# Patient Record
Sex: FEMALE | Race: WHITE | Employment: UNEMPLOYED | URBAN - METROPOLITAN AREA
[De-identification: names, ages, dates, MRNs, and addresses within clinical notes are randomized per-mention and may not be internally consistent; named-entity substitution may affect disease eponyms.]

---

## 2017-01-18 ENCOUNTER — OFFICE VISIT (OUTPATIENT)
Dept: INTERNAL MEDICINE CLINIC | Age: 72
End: 2017-01-18

## 2017-01-18 VITALS
SYSTOLIC BLOOD PRESSURE: 152 MMHG | WEIGHT: 187.8 LBS | RESPIRATION RATE: 16 BRPM | BODY MASS INDEX: 32.06 KG/M2 | HEART RATE: 82 BPM | TEMPERATURE: 98.5 F | HEIGHT: 64 IN | DIASTOLIC BLOOD PRESSURE: 78 MMHG | OXYGEN SATURATION: 98 %

## 2017-01-18 DIAGNOSIS — I10 ESSENTIAL HYPERTENSION: ICD-10-CM

## 2017-01-18 DIAGNOSIS — R51.9 ACUTE INTRACTABLE HEADACHE, UNSPECIFIED HEADACHE TYPE: Primary | ICD-10-CM

## 2017-01-18 RX ORDER — METHYLPREDNISOLONE 4 MG/1
TABLET ORAL
Qty: 1 DOSE PACK | Refills: 0 | Status: SHIPPED | OUTPATIENT
Start: 2017-01-18 | End: 2017-01-24

## 2017-01-18 NOTE — PATIENT INSTRUCTIONS

## 2017-01-18 NOTE — PROGRESS NOTES
Rashmi Parks is a 67 y.o. female who was seen in clinic today (1/18/2017). Assessment & Plan:  Dmitry Licona was seen today for headache, sinus pain and neck pain. Diagnoses and all orders for this visit:    Acute intractable headache, unspecified headache type- this is a new problem, differential dx reviewed with the patient, unclear. Favor cervical or related to sinuses. Does not sound fibromyalgia related. Will treat with heat to the back of the neck, meds below. Red flags were reviewed with the patient to RTC or notify me, expected time course for resolution reviewed. -     methylPREDNISolone (MEDROL DOSEPACK) 4 mg tablet; use as directed    Essential hypertension- uncontrolled, not taking BP meds, and does not want to, recommended to f/u with PCP. Follow-up Disposition:  Return if symptoms worsen or fail to improve.       ----------------------------------------------------------------------    Subjective:  Neurological Review  She is here today to talk about headaches. This is a new problem. Episodes are occuring continuously for 2 days ago, but has been intermittent for the last 7 days  The pain is described as dull ahcing pain and it is located from the back of the head up to the front. Associated symptoms include: congestion. Precipitating factors are: patient is aware of none. She was seen back in Dec for sinusitis, this improved but never resolved completely. She denies a history of recent head injury. Treatments for headaches currently include: unable to obtain relief with OTC meds (has tried heat/ice & ibuprofen & flonase). Prior to Admission medications    Medication Sig Start Date End Date Taking? Authorizing Provider   esomeprazole (NEXIUM) 40 mg capsule Take 1 Cap by mouth daily. 11/9/16  Yes Sobia Salas MD   amphetamine-dextroamphetamine XR (ADDERALL XR) 20 mg XR capsule Take 1 Cap by mouth every morning for 30 days. Max Daily Amount: 20 mg.  As needed 1/4/17 2/3/17 Yes Morena Solis MD   DULoxetine (CYMBALTA) 60 mg capsule Take 1 Cap by mouth daily. 10/25/16  Yes Morena Solis MD   estradiol (ESTRACE) 0.5 mg tablet TAKE ONE TABLET BY MOUTH ONCE DAILY 10/10/16  Yes Morena Solis MD   levothyroxine (SYNTHROID) 100 mcg tablet TAKE ONE TABLET BY MOUTH ONCE DAILY BEFORE  BREAKFAST FOR HYPOTHYROIDISM 10/10/16  Yes Morena Solis MD   ibuprofen (MOTRIN) 800 mg tablet Take 800 mg by mouth every eight (8) hours as needed for Pain. Yes Historical Provider   fluticasone (FLONASE) 50 mcg/actuation nasal spray 2 Sprays by Both Nostrils route daily. Patient taking differently: 2 Sprays by Both Nostrils route daily as needed. 1/14/16  Yes Morena Solis MD   amLODIPine (NORVASC) 5 mg tablet Take 1 Tab by mouth daily. 12/2/16   Morena Solis MD          No Known Allergies        Review of Systems   Constitutional: Negative for chills and fever. HENT: Positive for congestion. Negative for ear pain. Eyes: Negative for blurred vision and pain. Musculoskeletal: Positive for myalgias (typical fibromyalgia pain) and neck pain (typical fibromyalgia pain). Neurological: Positive for headaches. Negative for dizziness and tingling. Objective:   Physical Exam   Constitutional: No distress. HENT:   Right Ear: Tympanic membrane is scarred. Tympanic membrane is not erythematous and not bulging. No middle ear effusion. Left Ear: Tympanic membrane is scarred. Tympanic membrane is not erythematous and not bulging. No middle ear effusion. Nose: No mucosal edema or rhinorrhea. Right sinus exhibits no maxillary sinus tenderness and no frontal sinus tenderness. Left sinus exhibits no maxillary sinus tenderness and no frontal sinus tenderness. Eyes: Conjunctivae are normal. No scleral icterus. Neck: Neck supple. Cardiovascular: Regular rhythm and normal heart sounds. No murmur heard. Pulmonary/Chest: Effort normal and breath sounds normal. She has no wheezes. She has no rales. Musculoskeletal:        Cervical back: She exhibits decreased range of motion (painful w/ full extension) and tenderness. She exhibits no bony tenderness, no deformity, no pain and no spasm. Lymphadenopathy:     She has no cervical adenopathy. Neurological: She has normal strength. A sensory deficit is present. No cranial nerve deficit. Visit Vitals    /78    Pulse 82    Temp 98.5 °F (36.9 °C) (Oral)    Resp 16    Ht 5' 4.25\" (1.632 m)    Wt 187 lb 12.8 oz (85.2 kg)    SpO2 98%    BMI 31.99 kg/m2         Disclaimer:  Advised her to call back or return to office if symptoms worsen/change/persist.  Discussed expected course/resolution/complications of diagnosis in detail with patient. Medication risks/benefits/costs/interactions/alternatives discussed with patient. She was given an after visit summary which includes diagnoses, current medications, & vitals. She expressed understanding with the diagnosis and plan.         Charly Kilpatrick MD

## 2017-01-18 NOTE — PROGRESS NOTES
Treated for sinus infection in November. Symptoms never resolved and now has continuous headache and neck pain into the top of her head.

## 2017-01-26 RX ORDER — DEXTROAMPHETAMINE SACCHARATE, AMPHETAMINE ASPARTATE MONOHYDRATE, DEXTROAMPHETAMINE SULFATE AND AMPHETAMINE SULFATE 5; 5; 5; 5 MG/1; MG/1; MG/1; MG/1
20 CAPSULE, EXTENDED RELEASE ORAL
Qty: 30 CAP | Refills: 0 | Status: SHIPPED | OUTPATIENT
Start: 2017-01-26 | End: 2017-02-25

## 2017-01-27 ENCOUNTER — TELEPHONE (OUTPATIENT)
Dept: INTERNAL MEDICINE CLINIC | Age: 72
End: 2017-01-27

## 2017-01-27 DIAGNOSIS — M79.7 FIBROMYALGIA: Primary | ICD-10-CM

## 2017-01-30 ENCOUNTER — TELEPHONE (OUTPATIENT)
Dept: INTERNAL MEDICINE CLINIC | Age: 72
End: 2017-01-30

## 2017-01-31 NOTE — TELEPHONE ENCOUNTER
Pt states she is having sinus headaches again. She states she finished her abx and steroids and it first went away but now has come back again.

## 2017-02-01 ENCOUNTER — TELEPHONE (OUTPATIENT)
Dept: INTERNAL MEDICINE CLINIC | Age: 72
End: 2017-02-01

## 2017-02-01 NOTE — TELEPHONE ENCOUNTER
She is due for her routine follow up on her medications in 3/17. Can refill for longer amount at that time. Advise to make her appt as not currently scheduled.

## 2017-02-01 NOTE — TELEPHONE ENCOUNTER
Justice Ward (Self) [phone]     Patient is returning KJ's call. I read her the note that Dr. Evelio Parks says she needs an appt, but she wants to talk to the nurse. Will only be at number above until 1pm today.

## 2017-02-01 NOTE — TELEPHONE ENCOUNTER
Called and spoke with pt and asked pt how she is feeling. Pt stated she is feeling better today. Pt stated she would like to wait until Monday to see how she feels before making an appt. Also notified pt her script for Adderall  is ready for . Pt stated Dr Nithin Del Cid usually gives her 3 scripts at a time.

## 2017-02-28 ENCOUNTER — OFFICE VISIT (OUTPATIENT)
Dept: INTERNAL MEDICINE CLINIC | Age: 72
End: 2017-02-28

## 2017-02-28 VITALS
HEIGHT: 64 IN | SYSTOLIC BLOOD PRESSURE: 122 MMHG | DIASTOLIC BLOOD PRESSURE: 80 MMHG | TEMPERATURE: 98.7 F | RESPIRATION RATE: 16 BRPM | BODY MASS INDEX: 32.06 KG/M2 | HEART RATE: 83 BPM | WEIGHT: 187.8 LBS | OXYGEN SATURATION: 98 %

## 2017-02-28 DIAGNOSIS — K21.9 GASTROESOPHAGEAL REFLUX DISEASE WITHOUT ESOPHAGITIS: ICD-10-CM

## 2017-02-28 DIAGNOSIS — Z13.39 SCREENING FOR ALCOHOLISM: ICD-10-CM

## 2017-02-28 DIAGNOSIS — J30.89 NON-SEASONAL ALLERGIC RHINITIS DUE TO OTHER ALLERGIC TRIGGER: ICD-10-CM

## 2017-02-28 DIAGNOSIS — Z79.890 POSTMENOPAUSAL HRT (HORMONE REPLACEMENT THERAPY): ICD-10-CM

## 2017-02-28 DIAGNOSIS — E78.00 PURE HYPERCHOLESTEROLEMIA: ICD-10-CM

## 2017-02-28 DIAGNOSIS — F98.8 ADD (ATTENTION DEFICIT DISORDER): ICD-10-CM

## 2017-02-28 DIAGNOSIS — E55.9 VITAMIN D DEFICIENCY: ICD-10-CM

## 2017-02-28 DIAGNOSIS — Z00.00 MEDICARE ANNUAL WELLNESS VISIT, SUBSEQUENT: ICD-10-CM

## 2017-02-28 DIAGNOSIS — I10 ESSENTIAL HYPERTENSION: Primary | ICD-10-CM

## 2017-02-28 DIAGNOSIS — E03.9 ACQUIRED HYPOTHYROIDISM: ICD-10-CM

## 2017-02-28 DIAGNOSIS — M79.7 FIBROMYALGIA: ICD-10-CM

## 2017-02-28 RX ORDER — DEXTROAMPHETAMINE SACCHARATE, AMPHETAMINE ASPARTATE MONOHYDRATE, DEXTROAMPHETAMINE SULFATE AND AMPHETAMINE SULFATE 5; 5; 5; 5 MG/1; MG/1; MG/1; MG/1
20 CAPSULE, EXTENDED RELEASE ORAL
Qty: 30 CAP | Refills: 0 | Status: SHIPPED | OUTPATIENT
Start: 2017-04-28 | End: 2017-05-31 | Stop reason: SDUPTHER

## 2017-02-28 RX ORDER — MINERAL OIL
180 ENEMA (ML) RECTAL
COMMUNITY
Start: 2017-02-28 | End: 2018-02-27

## 2017-02-28 RX ORDER — DEXTROAMPHETAMINE SACCHARATE, AMPHETAMINE ASPARTATE MONOHYDRATE, DEXTROAMPHETAMINE SULFATE AND AMPHETAMINE SULFATE 5; 5; 5; 5 MG/1; MG/1; MG/1; MG/1
20 CAPSULE, EXTENDED RELEASE ORAL
Qty: 30 CAP | Refills: 0 | Status: SHIPPED | OUTPATIENT
Start: 2017-03-28 | End: 2017-04-27

## 2017-02-28 RX ORDER — DEXTROAMPHETAMINE SACCHARATE, AMPHETAMINE ASPARTATE, DEXTROAMPHETAMINE SULFATE AND AMPHETAMINE SULFATE 5; 5; 5; 5 MG/1; MG/1; MG/1; MG/1
20 TABLET ORAL
COMMUNITY
End: 2017-02-28 | Stop reason: SDUPTHER

## 2017-02-28 RX ORDER — DEXTROAMPHETAMINE SACCHARATE, AMPHETAMINE ASPARTATE, DEXTROAMPHETAMINE SULFATE AND AMPHETAMINE SULFATE 5; 5; 5; 5 MG/1; MG/1; MG/1; MG/1
20 TABLET ORAL DAILY
Qty: 30 TAB | Refills: 0 | Status: SHIPPED | OUTPATIENT
Start: 2017-02-28 | End: 2017-05-31 | Stop reason: SDUPTHER

## 2017-02-28 NOTE — PROGRESS NOTES
HPI:  Santos Davies is a 67y.o. year old female who returns to clinic today for follow up: hypothyroid, ADD, GERD, elevated BP, fibromyalgia, and depression.       1. Fibromyalgia: symptoms are stable with cymbalta. She continues to have some daily pain. 2. Hypothyroid: Thyroid ROS:  denies weight changes, heat/cold intolerance, bowel/skin changes or CVS symptoms. 3. ADD: taking adderall xr daily and doing well with her focus. No.side effects   4. Cardiovascular: The home BP readings outside of office not checking . Diet and Lifestyle: generally follows a low fat low cholesterol diet, generally follows a low sodium diet. No CP or SOB with exertion. 5. Depression:  Treatment includes cymbalta and no other therapies. Ongoing symptoms include none. She denies depressed mood, anhedonia and No suicidal ideation. .   She experiences the following side effects from the treatment: none. 6. GERD: Taking medication daily and if misses dose has breakthrough gerd symptoms. Following gerd precautions. Current Outpatient Prescriptions   Medication Sig Dispense Refill    dextroamphetamine-amphetamine (ADDERALL) 20 mg tablet Take 20 mg by mouth.  esomeprazole (NEXIUM) 40 mg capsule Take 1 Cap by mouth daily. 90 Cap 1    DULoxetine (CYMBALTA) 60 mg capsule Take 1 Cap by mouth daily. 90 Cap 1    estradiol (ESTRACE) 0.5 mg tablet TAKE ONE TABLET BY MOUTH ONCE DAILY 90 Tab 1    levothyroxine (SYNTHROID) 100 mcg tablet TAKE ONE TABLET BY MOUTH ONCE DAILY BEFORE  BREAKFAST FOR HYPOTHYROIDISM 90 Tab 1    ibuprofen (MOTRIN) 800 mg tablet Take 800 mg by mouth every eight (8) hours as needed for Pain.  fluticasone (FLONASE) 50 mcg/actuation nasal spray 2 Sprays by Both Nostrils route daily. (Patient taking differently: 2 Sprays by Both Nostrils route daily as needed.) 1 Bottle 5    amLODIPine (NORVASC) 5 mg tablet Take 1 Tab by mouth daily.  90 Tab 1     Not on File  Social History   Substance Use Topics    Smoking status: Former Smoker     Quit date: 1/1/1975    Smokeless tobacco: Never Used    Alcohol use No         Review of Systems   Constitutional: Negative for malaise/fatigue. HENT:        Notes has daily sinus pressure and nasal congestion with post nasal drainage for months. Clear drainage. Respiratory: Negative for shortness of breath. Cardiovascular: Negative for chest pain and leg swelling. Gastrointestinal: Negative for abdominal pain and heartburn. Neurological: Negative for dizziness and headaches. Physical Exam   Constitutional: She appears well-developed. No distress. /80 (BP 1 Location: Right arm, BP Patient Position: Sitting)  Pulse 83  Temp 98.7 °F (37.1 °C) (Oral)   Resp 16  Ht 5' 4.25\" (1.632 m)  Wt 187 lb 12.8 oz (85.2 kg)  SpO2 98%  BMI 31.99 kg/m2   Neck: Carotid bruit is not present. No thyromegaly present. Cardiovascular: Normal rate, regular rhythm, normal heart sounds and intact distal pulses. No murmur heard. Pulmonary/Chest: Effort normal and breath sounds normal. She has no wheezes. Abdominal: Soft. Bowel sounds are normal. She exhibits no distension and no mass. There is no tenderness. Musculoskeletal: She exhibits no edema. Psychiatric: She has a normal mood and affect. Vitals reviewed. Assessment & Plan:    ICD-10-CM ICD-9-CM    1. Essential hypertension  Well controlled, continue current medication. M15 845.4 METABOLIC PANEL, COMPREHENSIVE   2. Fibromyalgia  Stable. Continue current medication. M79.7 729.1    3. ADD (attention deficit disorder)  Well controlled, continue current medication. F98.8 314.00    4. Acquired hypothyroidism   Continue current plan and check lab work. E03.9 244.9 TSH 3RD GENERATION      T4, FREE   5. Vitamin D deficiency  Check level E55.9 268.9 VITAMIN D, 25 HYDROXY   6. Gastroesophageal reflux disease without esophagitis  Well controlled, continue current medication. K21.9 530.81    7.  Postmenopausal HRT (hormone replacement therapy)  Long discussion with patient regarding use of hormone replacement therapy. She is counseled that there is a small increase risk of stroke, heart disease, and breast cancer with chronic use of low dose estrogen. She is adamant that she needs to remain on this medication due to significant hot flashes and is willing to accept the risks of remaining on long term hormone replacement  therapy. Z79.890 V07.4    8. Screening for alcoholism Z13.89 V79.1    9. Non-seasonal allergic rhinitis due to other allergic trigger  Trial of allegra and flonase daily. J30.89     10. Medicare annual wellness visit, subsequent  Health maintenance reviewed and updated with patient today at visit. Z00.00 V70.0    11. Pure hypercholesterolemia  Please continue to work on low fat, low cholesterol diet and exercise. E78.00 272.0 LIPID PANEL      CBC WITH AUTOMATED DIFF     Orders Placed This Encounter    LIPID PANEL    METABOLIC PANEL, COMPREHENSIVE    CBC WITH AUTOMATED DIFF    VITAMIN D, 25 HYDROXY    TSH 3RD GENERATION    T4, FREE    DISCONTD: dextroamphetamine-amphetamine (ADDERALL) 20 mg tablet    fexofenadine (ALLEGRA) 180 mg tablet    dextroamphetamine-amphetamine (ADDERALL) 20 mg tablet    amphetamine-dextroamphetamine XR (ADDERALL XR) 20 mg XR capsule    amphetamine-dextroamphetamine XR (ADDERALL XR) 20 mg XR capsule         Follow-up Disposition:  Return for follow up. Advised her to call back or return to office if symptoms worsen/change/persist.  Discussed expected course/resolution/complications of diagnosis in detail with patient. Medication risks/benefits/costs/interactions/alternatives discussed with patient. She was given an after visit summary which includes diagnoses, current medications, & vitals. She expressed understanding with the diagnosis and plan.       This is also a Subsequent Medicare Annual Wellness Visit providing Personalized Prevention Plan Services (PPPS) (Performed 12 months after initial AWV and PPPS )    I have reviewed the patient's medical history in detail and updated the computerized patient record. History     Past Medical History:   Diagnosis Date    ADD (attention deficit disorder) 9/15/2015    Arrhythmia     Depression 1/14/2016    Fibromyalgia 9/15/2015    Gastroesophageal reflux disease without esophagitis 9/15/2015    History of DVT (deep vein thrombosis) 9/15/2015    HTN (hypertension) 2/18/2016    Menopausal syndrome (hot flashes) 9/15/2015    Unspecified hypothyroidism 9/15/2015      Past Surgical History:   Procedure Laterality Date    ABLATION OF SVT  2010    CARDIAC SURG PROCEDURE UNLIST  2010    ablation    HX GI  2010    benign stomach tumor    HX GYN      ovarian cyst removal    HX GYN  1985    hysterectomy     Current Outpatient Prescriptions   Medication Sig Dispense Refill    dextroamphetamine-amphetamine (ADDERALL) 20 mg tablet Take 20 mg by mouth.  esomeprazole (NEXIUM) 40 mg capsule Take 1 Cap by mouth daily. 90 Cap 1    DULoxetine (CYMBALTA) 60 mg capsule Take 1 Cap by mouth daily. 90 Cap 1    estradiol (ESTRACE) 0.5 mg tablet TAKE ONE TABLET BY MOUTH ONCE DAILY 90 Tab 1    levothyroxine (SYNTHROID) 100 mcg tablet TAKE ONE TABLET BY MOUTH ONCE DAILY BEFORE  BREAKFAST FOR HYPOTHYROIDISM 90 Tab 1    ibuprofen (MOTRIN) 800 mg tablet Take 800 mg by mouth every eight (8) hours as needed for Pain.  fluticasone (FLONASE) 50 mcg/actuation nasal spray 2 Sprays by Both Nostrils route daily. (Patient taking differently: 2 Sprays by Both Nostrils route daily as needed.) 1 Bottle 5    amLODIPine (NORVASC) 5 mg tablet Take 1 Tab by mouth daily.  80 Tab 1     Not on File  Family History   Problem Relation Age of Onset    Diabetes Mother     Heart Disease Mother     Hypertension Mother     Lung Disease Father      COPD    Cancer Brother      bladder    Heart Disease Brother     Cancer Maternal Aunt      breast    Diabetes Paternal Uncle     Hypertension Sister      Social History   Substance Use Topics    Smoking status: Former Smoker     Quit date: 1/1/1975    Smokeless tobacco: Never Used    Alcohol use No     Patient Active Problem List   Diagnosis Code    Fibromyalgia M79.7    Hypothyroidism E03.9    Gastroesophageal reflux disease without esophagitis K21.9    Menopausal syndrome (hot flashes) N95.1    ADD (attention deficit disorder) F98.8    History of DVT (deep vein thrombosis) Z86.718    Need for prophylactic vaccination against Streptococcus pneumoniae (pneumococcus) Z23    Advanced care planning/counseling discussion Z71.89    Depression F32.9    HTN (hypertension) I10       Depression Risk Factor Screening:     PHQ 2 / 9, over the last two weeks 2/28/2017   Little interest or pleasure in doing things Not at all   Feeling down, depressed or hopeless Not at all   Total Score PHQ 2 0   Trouble falling or staying asleep, or sleeping too much -   Feeling tired or having little energy -   Poor appetite or overeating -   Feeling bad about yourself - or that you are a failure or have let yourself or your family down -   Trouble concentrating on things such as school, work, reading or watching TV -   Moving or speaking so slowly that other people could have noticed; or the opposite being so fidgety that others notice -   Thoughts of being better off dead, or hurting yourself in some way -   PHQ 9 Score -   How difficult have these problems made it for you to do your work, take care of your home and get along with others -     Alcohol Risk Factor Screening: On any occasion during the past 3 months, have you had more than 3 drinks containing alcohol? No    Do you average more than 7 drinks per week? No      Functional Ability and Level of Safety:     Hearing Loss   none    Activities of Daily Living   Self-care.    Requires assistance with: no ADLs    Fall Risk     Fall Risk Assessment, last 12 mths 2/28/2017   Able to walk? Yes   Fall in past 12 months? Yes   Fall with injury? -   Number of falls in past 12 months 1   Fall Risk Score -     Abuse Screen   Patient is not abused    Review of Systems   As above    Physical Examination     Evaluation of Cognitive Function:  Mood/affect:  happy  Appearance: age appropriate  Family member/caregiver input: none    As above    Patient Care Team:  Katerina Vasquez MD as PCP - General (Internal Medicine)  Marsha Zimmerman RN as Nurse Navigator (Internal Medicine)    Advice/Referrals/Counseling   Education and counseling provided:  Are appropriate based on today's review and evaluation      Assessment/Plan   As above  ACP reviewed on file.

## 2017-03-11 LAB
25(OH)D3+25(OH)D2 SERPL-MCNC: 30.8 NG/ML (ref 30–100)
ALBUMIN SERPL-MCNC: 4.2 G/DL (ref 3.5–4.8)
ALBUMIN/GLOB SERPL: 1.7 {RATIO} (ref 1.1–2.5)
ALP SERPL-CCNC: 65 IU/L (ref 39–117)
ALT SERPL-CCNC: 19 IU/L (ref 0–32)
AST SERPL-CCNC: 17 IU/L (ref 0–40)
BASOPHILS # BLD AUTO: 0 X10E3/UL (ref 0–0.2)
BASOPHILS NFR BLD AUTO: 1 %
BILIRUB SERPL-MCNC: 0.5 MG/DL (ref 0–1.2)
BUN SERPL-MCNC: 13 MG/DL (ref 8–27)
BUN/CREAT SERPL: 19 (ref 11–26)
CALCIUM SERPL-MCNC: 9 MG/DL (ref 8.7–10.3)
CHLORIDE SERPL-SCNC: 101 MMOL/L (ref 96–106)
CHOLEST SERPL-MCNC: 240 MG/DL (ref 100–199)
CO2 SERPL-SCNC: 27 MMOL/L (ref 18–29)
CREAT SERPL-MCNC: 0.7 MG/DL (ref 0.57–1)
EOSINOPHIL # BLD AUTO: 0.1 X10E3/UL (ref 0–0.4)
EOSINOPHIL NFR BLD AUTO: 2 %
ERYTHROCYTE [DISTWIDTH] IN BLOOD BY AUTOMATED COUNT: 15 % (ref 12.3–15.4)
GLOBULIN SER CALC-MCNC: 2.5 G/DL (ref 1.5–4.5)
GLUCOSE SERPL-MCNC: 95 MG/DL (ref 65–99)
HCT VFR BLD AUTO: 43.1 % (ref 34–46.6)
HDLC SERPL-MCNC: 53 MG/DL
HGB BLD-MCNC: 14 G/DL (ref 11.1–15.9)
IMM GRANULOCYTES # BLD: 0 X10E3/UL (ref 0–0.1)
IMM GRANULOCYTES NFR BLD: 0 %
LDLC SERPL CALC-MCNC: 159 MG/DL (ref 0–99)
LYMPHOCYTES # BLD AUTO: 1.6 X10E3/UL (ref 0.7–3.1)
LYMPHOCYTES NFR BLD AUTO: 33 %
MCH RBC QN AUTO: 28.5 PG (ref 26.6–33)
MCHC RBC AUTO-ENTMCNC: 32.5 G/DL (ref 31.5–35.7)
MCV RBC AUTO: 88 FL (ref 79–97)
MONOCYTES # BLD AUTO: 0.3 X10E3/UL (ref 0.1–0.9)
MONOCYTES NFR BLD AUTO: 7 %
NEUTROPHILS # BLD AUTO: 2.9 X10E3/UL (ref 1.4–7)
NEUTROPHILS NFR BLD AUTO: 57 %
PLATELET # BLD AUTO: 193 X10E3/UL (ref 150–379)
POTASSIUM SERPL-SCNC: 4.6 MMOL/L (ref 3.5–5.2)
PROT SERPL-MCNC: 6.7 G/DL (ref 6–8.5)
RBC # BLD AUTO: 4.92 X10E6/UL (ref 3.77–5.28)
SODIUM SERPL-SCNC: 141 MMOL/L (ref 134–144)
T4 FREE SERPL-MCNC: 0.71 NG/DL (ref 0.82–1.77)
TRIGL SERPL-MCNC: 138 MG/DL (ref 0–149)
TSH SERPL DL<=0.005 MIU/L-ACNC: 11.42 UIU/ML (ref 0.45–4.5)
VLDLC SERPL CALC-MCNC: 28 MG/DL (ref 5–40)
WBC # BLD AUTO: 5 X10E3/UL (ref 3.4–10.8)

## 2017-03-18 NOTE — PROGRESS NOTES
Notify patient that TSH is not at goal. Confirm taking medication daily and if so will need to increase  levothyroxine to 112 mcg qam # 30 and #1 RF. Recheck TSH in 6 weeks. Continue to work on low fat, low cholesterol diet and exercise.

## 2017-03-22 ENCOUNTER — TELEPHONE (OUTPATIENT)
Dept: INTERNAL MEDICINE CLINIC | Age: 72
End: 2017-03-22

## 2017-03-22 RX ORDER — LEVOTHYROXINE SODIUM 112 UG/1
112 TABLET ORAL
Qty: 30 TAB | Refills: 1 | Status: SHIPPED | OUTPATIENT
Start: 2017-03-22 | End: 2017-08-01 | Stop reason: SDUPTHER

## 2017-03-22 NOTE — TELEPHONE ENCOUNTER
Called pt and gave lab results and pt stated she is taking levothyroxine everyday. Informed pt will increase her levothyroxine and send script to pt pharmacy.

## 2017-03-22 NOTE — TELEPHONE ENCOUNTER
Baron Lam (Self) [phone]     Yasmine Dennis, patient returned your call. Asked that you call the number above, will be there until 2pm today.

## 2017-04-20 ENCOUNTER — TELEPHONE (OUTPATIENT)
Dept: INTERNAL MEDICINE CLINIC | Age: 72
End: 2017-04-20

## 2017-05-02 DIAGNOSIS — K21.9 GASTROESOPHAGEAL REFLUX DISEASE WITHOUT ESOPHAGITIS: ICD-10-CM

## 2017-05-02 RX ORDER — ESTRADIOL 0.5 MG/1
TABLET ORAL
Qty: 90 TAB | Refills: 1 | Status: CANCELLED | OUTPATIENT
Start: 2017-05-02

## 2017-05-02 RX ORDER — DULOXETIN HYDROCHLORIDE 60 MG/1
60 CAPSULE, DELAYED RELEASE ORAL DAILY
Qty: 90 CAP | Refills: 1 | Status: CANCELLED | OUTPATIENT
Start: 2017-05-02

## 2017-05-02 RX ORDER — ESOMEPRAZOLE MAGNESIUM 40 MG/1
40 CAPSULE, DELAYED RELEASE ORAL DAILY
Qty: 90 CAP | Refills: 1 | Status: CANCELLED | OUTPATIENT
Start: 2017-05-02

## 2017-05-04 ENCOUNTER — TELEPHONE (OUTPATIENT)
Dept: INTERNAL MEDICINE CLINIC | Age: 72
End: 2017-05-04

## 2017-05-04 DIAGNOSIS — K21.9 GASTROESOPHAGEAL REFLUX DISEASE WITHOUT ESOPHAGITIS: ICD-10-CM

## 2017-05-04 DIAGNOSIS — Z12.31 VISIT FOR SCREENING MAMMOGRAM: Primary | ICD-10-CM

## 2017-05-04 RX ORDER — DULOXETIN HYDROCHLORIDE 60 MG/1
60 CAPSULE, DELAYED RELEASE ORAL DAILY
Qty: 90 CAP | Refills: 1 | Status: SHIPPED | OUTPATIENT
Start: 2017-05-04 | End: 2017-08-01 | Stop reason: SDUPTHER

## 2017-05-04 RX ORDER — ESOMEPRAZOLE MAGNESIUM 40 MG/1
40 CAPSULE, DELAYED RELEASE ORAL DAILY
Qty: 90 CAP | Refills: 1 | Status: SHIPPED | OUTPATIENT
Start: 2017-05-04 | End: 2017-11-02 | Stop reason: SDUPTHER

## 2017-05-04 RX ORDER — ESTRADIOL 0.5 MG/1
TABLET ORAL
Qty: 90 TAB | Refills: 1 | Status: SHIPPED | OUTPATIENT
Start: 2017-05-04 | End: 2018-01-03 | Stop reason: SDUPTHER

## 2017-05-31 NOTE — TELEPHONE ENCOUNTER
Patient asking if she can  the rx's tomorrow afternoon (Thurs, 6/1) since she will be coming into town. She usually gets 3 months at a time.

## 2017-06-02 ENCOUNTER — TELEPHONE (OUTPATIENT)
Dept: INTERNAL MEDICINE CLINIC | Age: 72
End: 2017-06-02

## 2017-06-02 RX ORDER — DEXTROAMPHETAMINE SACCHARATE, AMPHETAMINE ASPARTATE MONOHYDRATE, DEXTROAMPHETAMINE SULFATE AND AMPHETAMINE SULFATE 5; 5; 5; 5 MG/1; MG/1; MG/1; MG/1
20 CAPSULE, EXTENDED RELEASE ORAL
Qty: 30 CAP | Refills: 0 | Status: SHIPPED | OUTPATIENT
Start: 2017-07-01 | End: 2017-08-30 | Stop reason: SDUPTHER

## 2017-06-02 RX ORDER — DEXTROAMPHETAMINE SACCHARATE, AMPHETAMINE ASPARTATE MONOHYDRATE, DEXTROAMPHETAMINE SULFATE AND AMPHETAMINE SULFATE 5; 5; 5; 5 MG/1; MG/1; MG/1; MG/1
20 CAPSULE, EXTENDED RELEASE ORAL
Qty: 30 CAP | Refills: 0 | Status: SHIPPED | OUTPATIENT
Start: 2017-08-01 | End: 2017-08-28 | Stop reason: SDUPTHER

## 2017-06-02 RX ORDER — DEXTROAMPHETAMINE SACCHARATE, AMPHETAMINE ASPARTATE MONOHYDRATE, DEXTROAMPHETAMINE SULFATE AND AMPHETAMINE SULFATE 5; 5; 5; 5 MG/1; MG/1; MG/1; MG/1
20 CAPSULE, EXTENDED RELEASE ORAL
Qty: 30 CAP | Refills: 0 | Status: SHIPPED | OUTPATIENT
Start: 2017-06-02 | End: 2017-08-30 | Stop reason: SDUPTHER

## 2017-06-07 NOTE — TELEPHONE ENCOUNTER
Notify pt due now for her repeat TSH. Also due for mammogram last done 1/16. Need mammogram to continue to refill estrace.

## 2017-07-07 ENCOUNTER — HOSPITAL ENCOUNTER (OUTPATIENT)
Dept: MAMMOGRAPHY | Age: 72
Discharge: HOME | End: 2017-07-07
Attending: INTERNAL MEDICINE
Payer: MEDICARE

## 2017-07-07 DIAGNOSIS — Z12.31 VISIT FOR SCREENING MAMMOGRAM: ICD-10-CM

## 2017-07-07 PROCEDURE — 77067 SCR MAMMO BI INCL CAD: CPT

## 2017-08-09 ENCOUNTER — OFFICE VISIT (OUTPATIENT)
Dept: INTERNAL MEDICINE CLINIC | Age: 72
End: 2017-08-09

## 2017-08-09 VITALS
SYSTOLIC BLOOD PRESSURE: 130 MMHG | BODY MASS INDEX: 31.79 KG/M2 | RESPIRATION RATE: 12 BRPM | HEART RATE: 62 BPM | WEIGHT: 186.2 LBS | OXYGEN SATURATION: 97 % | TEMPERATURE: 98.1 F | HEIGHT: 64 IN | DIASTOLIC BLOOD PRESSURE: 75 MMHG

## 2017-08-09 DIAGNOSIS — E78.00 PURE HYPERCHOLESTEROLEMIA: ICD-10-CM

## 2017-08-09 DIAGNOSIS — I10 ESSENTIAL HYPERTENSION: ICD-10-CM

## 2017-08-09 DIAGNOSIS — K21.9 GASTROESOPHAGEAL REFLUX DISEASE WITHOUT ESOPHAGITIS: ICD-10-CM

## 2017-08-09 DIAGNOSIS — F98.8 ADD (ATTENTION DEFICIT DISORDER): ICD-10-CM

## 2017-08-09 DIAGNOSIS — E03.9 ACQUIRED HYPOTHYROIDISM: ICD-10-CM

## 2017-08-09 DIAGNOSIS — M79.7 FIBROMYALGIA: Primary | ICD-10-CM

## 2017-08-09 DIAGNOSIS — R53.83 FATIGUE, UNSPECIFIED TYPE: ICD-10-CM

## 2017-08-09 RX ORDER — DULOXETIN HYDROCHLORIDE 60 MG/1
60 CAPSULE, DELAYED RELEASE ORAL DAILY
Qty: 90 CAP | Refills: 1 | Status: SHIPPED | OUTPATIENT
Start: 2017-08-09 | End: 2017-11-02 | Stop reason: SDUPTHER

## 2017-08-09 NOTE — PROGRESS NOTES
HPI:  Cherelle Stewart is a 67y.o. year old female who returns to clinic today for follow up: hypothyroid, ADD, GERD, fibromyalgia, and depression.       1. Fibromyalgia: symptoms are stable with cymbalta. She continues to have some daily pain. 2. Hypothyroid: Thyroid ROS: has some fatigue. denies weight changes, heat/cold intolerance, bowel/skin changes or CVS symptoms. 3. ADD: taking adderall xr daily and doing well with her focus. No.side effects   4. Cardiovascular: Hypertension: The home BP readings outside of office not checking . Diet and Lifestyle: generally follows a low fat low cholesterol diet, generally follows a low sodium diet. No CP or SOB with exertion. 5. Depression:  Treatment includes cymbalta and no other therapies. Ongoing symptoms include none. She denies depressed mood, anhedonia and No suicidal ideation. .   She experiences the following side effects from the treatment: none. 6. GERD: Taking medication daily and if misses dose has breakthrough gerd symptoms. Following gerd precautions. Current Outpatient Prescriptions   Medication Sig Dispense Refill    levothyroxine (SYNTHROID) 112 mcg tablet Take 1 Tab by mouth Daily (before breakfast). 30 Tab 0    DULoxetine (CYMBALTA) 60 mg capsule Take 1 Cap by mouth daily. 90 Cap 0    amphetamine-dextroamphetamine XR (ADDERALL XR) 20 mg XR capsule Take 1 Cap (20 mg total) by mouth every morningEarliest Fill Date: 8/1/17. Max Daily Amount: 20 mg 30 Cap 0    esomeprazole (NEXIUM) 40 mg capsule Take 1 Cap by mouth daily. 90 Cap 1    estradiol (ESTRACE) 0.5 mg tablet TAKE ONE TABLET BY MOUTH ONCE DAILY 90 Tab 1    fexofenadine (ALLEGRA) 180 mg tablet Take 1 Tab by mouth.  amLODIPine (NORVASC) 5 mg tablet Take 1 Tab by mouth daily. 90 Tab 1    ibuprofen (MOTRIN) 800 mg tablet Take 800 mg by mouth every eight (8) hours as needed for Pain.  fluticasone (FLONASE) 50 mcg/actuation nasal spray 2 Sprays by Both Nostrils route daily. (Patient taking differently: 2 Sprays by Both Nostrils route daily as needed.) 1 Bottle 5     Not on File  Social History   Substance Use Topics    Smoking status: Former Smoker     Quit date: 1/1/1975    Smokeless tobacco: Never Used    Alcohol use No         Review of Systems   Constitutional: Negative for malaise/fatigue. Respiratory: Negative for shortness of breath. Cardiovascular: Negative for chest pain and leg swelling. Gastrointestinal: Negative for abdominal pain and heartburn. Neurological: Negative for dizziness and headaches. Physical Exam   Constitutional: She appears well-developed. No distress. /75 (BP 1 Location: Right arm, BP Patient Position: Sitting)  Pulse 62  Temp 98.1 °F (36.7 °C)  Resp 12  Ht 5' 4.25\" (1.632 m)  Wt 186 lb 3.2 oz (84.5 kg)  SpO2 97%  BMI 31.71 kg/m2   Neck: Carotid bruit is not present. No thyromegaly present. Cardiovascular: Normal rate, regular rhythm, normal heart sounds and intact distal pulses. No murmur heard. Pulmonary/Chest: Effort normal and breath sounds normal. She has no wheezes. Abdominal: Soft. Bowel sounds are normal. She exhibits no distension and no mass. There is no tenderness. Musculoskeletal: She exhibits no edema. Areas of trigger point tenderness over back and bilateral hips. Psychiatric: She has a normal mood and affect. Vitals reviewed. Assessment & Plan:    ICD-10-CM ICD-9-CM    1. Fibromyalgia  Stable continue current medication regimen with Cymbalta. M79.7 729.1    2. Acquired hypothyroidism  Continue current plan and check lab work. E03.9 244.9 TSH 3RD GENERATION      T4, FREE      REFERRAL TO ENDOCRINOLOGY   3. Essential hypertension  Well controlled, continue current medication. E23 108.7 METABOLIC PANEL, COMPREHENSIVE   4. Pure hypercholesterolemia E78.00 272.0 LIPID PANEL   5. Fatigue, unspecified type R53.83 780.79    6.  ADD (attention deficit disorder)  Well controlled, continue current medication. F98.8 314.00    7. Gastroesophageal reflux disease without esophagitis  Well controlled, continue current medication. K21.9 530.81         Follow-up Disposition:  Return in about 6 months (around 2/9/2018) for follow up. Advised her to call back or return to office if symptoms worsen/change/persist.  Discussed expected course/resolution/complications of diagnosis in detail with patient. Medication risks/benefits/costs/interactions/alternatives discussed with patient. She was given an after visit summary which includes diagnoses, current medications, & vitals. She expressed understanding with the diagnosis and plan. Epidermal Closure: running and interrupted

## 2017-08-10 LAB
ALBUMIN SERPL-MCNC: 4.4 G/DL (ref 3.5–4.8)
ALBUMIN/GLOB SERPL: 1.9 {RATIO} (ref 1.2–2.2)
ALP SERPL-CCNC: 64 IU/L (ref 39–117)
ALT SERPL-CCNC: 19 IU/L (ref 0–32)
AST SERPL-CCNC: 18 IU/L (ref 0–40)
BILIRUB SERPL-MCNC: 0.4 MG/DL (ref 0–1.2)
BUN SERPL-MCNC: 13 MG/DL (ref 8–27)
BUN/CREAT SERPL: 21 (ref 12–28)
CALCIUM SERPL-MCNC: 9 MG/DL (ref 8.7–10.3)
CHLORIDE SERPL-SCNC: 99 MMOL/L (ref 96–106)
CHOLEST SERPL-MCNC: 225 MG/DL (ref 100–199)
CO2 SERPL-SCNC: 27 MMOL/L (ref 18–29)
CREAT SERPL-MCNC: 0.62 MG/DL (ref 0.57–1)
GLOBULIN SER CALC-MCNC: 2.3 G/DL (ref 1.5–4.5)
GLUCOSE SERPL-MCNC: 94 MG/DL (ref 65–99)
HDLC SERPL-MCNC: 48 MG/DL
LDLC SERPL CALC-MCNC: 149 MG/DL (ref 0–99)
POTASSIUM SERPL-SCNC: 4.7 MMOL/L (ref 3.5–5.2)
PROT SERPL-MCNC: 6.7 G/DL (ref 6–8.5)
SODIUM SERPL-SCNC: 140 MMOL/L (ref 134–144)
T4 FREE SERPL-MCNC: 1.15 NG/DL (ref 0.82–1.77)
TRIGL SERPL-MCNC: 142 MG/DL (ref 0–149)
TSH SERPL DL<=0.005 MIU/L-ACNC: 2.17 UIU/ML (ref 0.45–4.5)
VLDLC SERPL CALC-MCNC: 28 MG/DL (ref 5–40)

## 2017-08-30 RX ORDER — DEXTROAMPHETAMINE SACCHARATE, AMPHETAMINE ASPARTATE MONOHYDRATE, DEXTROAMPHETAMINE SULFATE AND AMPHETAMINE SULFATE 5; 5; 5; 5 MG/1; MG/1; MG/1; MG/1
20 CAPSULE, EXTENDED RELEASE ORAL
Qty: 30 CAP | Refills: 0 | Status: SHIPPED | OUTPATIENT
Start: 2017-11-03 | End: 2017-12-01 | Stop reason: SDUPTHER

## 2017-08-30 RX ORDER — DEXTROAMPHETAMINE SACCHARATE, AMPHETAMINE ASPARTATE MONOHYDRATE, DEXTROAMPHETAMINE SULFATE AND AMPHETAMINE SULFATE 5; 5; 5; 5 MG/1; MG/1; MG/1; MG/1
20 CAPSULE, EXTENDED RELEASE ORAL
Qty: 30 CAP | Refills: 0 | Status: SHIPPED | OUTPATIENT
Start: 2017-09-03 | End: 2017-12-01 | Stop reason: SDUPTHER

## 2017-08-30 RX ORDER — DEXTROAMPHETAMINE SACCHARATE, AMPHETAMINE ASPARTATE MONOHYDRATE, DEXTROAMPHETAMINE SULFATE AND AMPHETAMINE SULFATE 5; 5; 5; 5 MG/1; MG/1; MG/1; MG/1
20 CAPSULE, EXTENDED RELEASE ORAL
Qty: 30 CAP | Refills: 0 | Status: SHIPPED | OUTPATIENT
Start: 2017-10-03 | End: 2017-10-11 | Stop reason: SDUPTHER

## 2017-10-11 ENCOUNTER — OFFICE VISIT (OUTPATIENT)
Dept: ENDOCRINOLOGY | Age: 72
End: 2017-10-11

## 2017-10-11 VITALS
BODY MASS INDEX: 31.76 KG/M2 | HEIGHT: 64 IN | DIASTOLIC BLOOD PRESSURE: 59 MMHG | WEIGHT: 186 LBS | SYSTOLIC BLOOD PRESSURE: 119 MMHG | HEART RATE: 72 BPM | RESPIRATION RATE: 16 BRPM | OXYGEN SATURATION: 98 %

## 2017-10-11 DIAGNOSIS — E03.9 ACQUIRED HYPOTHYROIDISM: Primary | ICD-10-CM

## 2017-10-11 DIAGNOSIS — L68.0 HIRSUTISM: ICD-10-CM

## 2017-10-11 DIAGNOSIS — L65.9 HAIR LOSS: ICD-10-CM

## 2017-10-11 DIAGNOSIS — E16.2 HYPOGLYCEMIC SYNDROME: ICD-10-CM

## 2017-10-11 RX ORDER — LEVOTHYROXINE SODIUM 112 UG/1
112 TABLET ORAL
Qty: 30 TAB | Refills: 11 | Status: SHIPPED | OUTPATIENT
Start: 2017-10-11

## 2017-10-11 NOTE — PATIENT INSTRUCTIONS
Thyroid:  Continue taking 112 mcg daily - switch to brand name  Consider taking a prenatal vitamin containing biotin. Take this in the evening with dinner (needs to be at least 4 hours after your thyroid medication). Diet instructions:  Reduce the amount of carbohydrates in your diet. This means not just avoiding sweets, sodas, or desserts but also reducing the amount of bread, pasta, rice, potatoes, corn, and crackers that you eat. Do not have more than one serving of carbs per meal (for example: do not eat a sandwich and potato chips in the same meal). Focus on eating mostly protein (meat, poultry, fish, shellfish, eggs), healthy fats (avocados, nuts, cheese, olive or coconut oil) and non-starchy vegetables (greens, carrots, tomatoes, bell peppers, broccoli, brussels sprouts, green beans, etc). If you fill yourself up with these foods, you won't even want the carbs.

## 2017-10-11 NOTE — PROGRESS NOTES
Endocrinology Visit    CC: hypothyroidism, hair loss    Referring provider: Mark Collier MD     History of present illness:  Katy Cee is a pleasant 67 y.o. female here for hypothyroidism. She was diagnosed with acquired hypothyroidism over 15 years ago and has been on levothyroxine for thyroid hormone replacement. She was stable on a dose of levothyroxine 112mcg daily until 2015 when TSH returned low on this dose. It was reduced to 100 mcg daily and f/u labs in 2016 were normal. However in March of this year, TSH returned elevated at 11 without a clear cause. Pt reports daily compliance with her medication. At that time, levothyroxine dose was increased to 112 mcg daily and f/u labs in August showed TSH of 2.1. She  currently takes levothyroxine 112 mcg daily. She  takes this correctly on an empty stomach, first thing in the morning waiting 30-60 minutes for food. She does take it with her other medications and drinks coffee with milk shortly after taking her meds. Does not take multivitamins. Her main complaint today is hair loss for the past year. Seems to be falling out more than usual, especially when brushing or washing. Only washes her hair 2-3 times/week. Denies any stressful life event that triggered it. Nails are brittle too, but have always been that way. She feels fatigued much of the time despite getting 8 hours of sleep per night. Wakes up 2-3 times per night to urinate. Reports difficulty losing weight and also sugar cravings (shaky when she is hungry). Does eat a carb heavy breakfast (special K with milk, oatmeal packets, bagel). Also has 1-2 doughnuts per day as a snack. Her mother had diabetes. She does not participate in dedicated exercise but would like to. She denies racing heart, tremor, palpitations, temperature intolerance, or changes to her bowel habits. She is post-menopausal - surgical menopause in her 45s and has been on estrogen for HRT since then.  Does complain of dark hairs on her chin, just started appearing 10 years ago. ROS: see HPI for pertinent positives and negatives, otherwise a 10 system review is negative    Problem list:  Patient Active Problem List   Diagnosis Code    Fibromyalgia M79.7    Hypothyroidism E03.9    Gastroesophageal reflux disease without esophagitis K21.9    Menopausal syndrome (hot flashes) N95.1    ADD (attention deficit disorder) F98.8    History of DVT (deep vein thrombosis) Z86.718    Need for prophylactic vaccination against Streptococcus pneumoniae (pneumococcus) Z23    Advanced care planning/counseling discussion Z71.89    Depression F32.9    HTN (hypertension) I10       Medical history:  Past Medical History:   Diagnosis Date    ADD (attention deficit disorder) 9/15/2015    Arrhythmia     Depression 1/14/2016    Fibromyalgia 9/15/2015    Gastroesophageal reflux disease without esophagitis 9/15/2015    History of DVT (deep vein thrombosis) 9/15/2015    HTN (hypertension) 2/18/2016    Menopausal syndrome (hot flashes) 9/15/2015    Unspecified hypothyroidism 9/15/2015       Past surgical history:  Past Surgical History:   Procedure Laterality Date    ABLATION OF SVT  2010    CARDIAC SURG PROCEDURE UNLIST  2010    ablation    HX GI  2010    benign stomach tumor    HX GYN      ovarian cyst removal    HX GYN  1985    hysterectomy       Medications:    Current Outpatient Prescriptions:     TETRAHYDROZOLINE HCL/ZINC SULF (EYE DROPS ALLERGY RELIEF OP), Apply  to eye., Disp: , Rfl:     [START ON 11/3/2017] amphetamine-dextroamphetamine XR (ADDERALL XR) 20 mg XR capsule, Take 1 Cap (20 mg total) by mouth every morningIndications: ATTENTION-DEFICIT HYPERACTIVITY DISORDER Earliest Fill Date: 11/3/17. Max Daily Amount: 20 mg, Disp: 30 Cap, Rfl: 0    levothyroxine (SYNTHROID) 112 mcg tablet, Take 1 Tab by mouth Daily (before breakfast). , Disp: 30 Tab, Rfl: 11    DULoxetine (CYMBALTA) 60 mg capsule, Take 1 Cap by mouth daily., Disp: 90 Cap, Rfl: 1    esomeprazole (NEXIUM) 40 mg capsule, Take 1 Cap by mouth daily. , Disp: 90 Cap, Rfl: 1    estradiol (ESTRACE) 0.5 mg tablet, TAKE ONE TABLET BY MOUTH ONCE DAILY, Disp: 90 Tab, Rfl: 1    fexofenadine (ALLEGRA) 180 mg tablet, Take 1 Tab by mouth., Disp: , Rfl:     amLODIPine (NORVASC) 5 mg tablet, Take 1 Tab by mouth daily. , Disp: 90 Tab, Rfl: 1    ibuprofen (MOTRIN) 800 mg tablet, Take 800 mg by mouth every eight (8) hours as needed for Pain., Disp: , Rfl:     FLUZONE HIGH-DOSE 2017-18, PF, syrg injection, TO BE ADMINISTERED BY PHARMACIST FOR IMMUNIZATION, Disp: , Rfl: 0    fluticasone (FLONASE) 50 mcg/actuation nasal spray, 2 Sprays by Both Nostrils route daily. (Patient taking differently: 2 Sprays by Both Nostrils route daily as needed.), Disp: 1 Bottle, Rfl: 5    Allergies:  No Known Allergies    Social History:  Social History     Social History    Marital status:      Spouse name: N/A    Number of children: N/A    Years of education: N/A     Occupational History    Not on file. Social History Main Topics    Smoking status: Former Smoker     Quit date: 1/1/1975    Smokeless tobacco: Never Used    Alcohol use No    Drug use: No    Sexual activity: No     Other Topics Concern    Not on file     Social History Narrative       Family History:  Family History   Problem Relation Age of Onset    Diabetes Mother     Heart Disease Mother     Hypertension Mother     Lung Disease Father      COPD    Cancer Brother      bladder    Heart Disease Brother     Cancer Maternal Aunt      breast    Diabetes Paternal Uncle     Hypertension Sister        Physical Exam:  Visit Vitals    /59    Pulse 72    Resp 16    Ht 5' 4\" (1.626 m)    Wt 186 lb (84.4 kg)    SpO2 98%    BMI 31.93 kg/m2     Gen: NAD  Heent: mucous membranes moist. No scleral or conjunctival injection, pink conjunctiva. Extra ocular muscles intact.   Thyroid: moves well with swallowing, normal size, normal texture, no masses appreciated  Heme/lymph: no cervical, supraclavicular or submandibular lymphadenopathy is appreciated. Pulmonary: clear to auscultation bilaterally, no wheezes/rhonchi or rales  Cardiovascular: regular rate and rhythm, no murmurs, rubs or gallops, good distal pulses  Extremities: no clubbing, cyanosis or edema. No onocholysis   Neuro: no tremor of the outstretch hands, reflexes are normal with normal relaxation phase. Normal gait, normal concentration  Skin: normal texture, warm and dry, no obvious hirsutism, very fine/faint eyebrows, hair on scalp appears full but more sparse in temporal area  Psyche: normal affect with good insight into her medical conditions    Pertinent lab review:    Component      Latest Ref Rng & Units 8/9/2017 8/9/2017 3/10/2017 3/10/2017          11:48 AM 11:48 AM  8:13 AM  8:13 AM   TSH      0.450 - 4.500 uIU/mL  2.170  11.420 (H)   T4, Free      0.82 - 1.77 ng/dL 1.15  0.71 (L)      Component      Latest Ref Rng & Units 1/12/2016 9/18/2015 9/18/2015           3:34 PM 11:04 AM 11:04 AM   TSH      0.450 - 4.500 uIU/mL 2.700  0.383 (L)   T4, Free      0.82 - 1.77 ng/dL  1.29      Assessment and plan:  Kailash Stewart is a pleasant 67 y.o. female here for hypothyroidism and concerns about hair loss. We discussed that the hair loss may have been the result of a period of time when she was under-replaced on levothyroxine 100 mcg daily and should start to improve as her thyroid levels stabilize. Based on recent TSH, I believe her current dose of levothyroxine 112mcg daily is appropriate, but to further assess thyroid hormone economy, we will check a TSH, Free T4, and Free T3 today (to ensure she is converting T4 to T3 adequately). I also advised that she switch to brand name for consistency and we discussed the appropriate way to take her medication. To evaluate for other causes of hair loss, will check a ferritin and testoterone today.  Advised that she start a prenatal vitamin with biotin in it. Also check A1c given her sugar cravings and symptoms of hypoglycemia. Discussed ways to reduce simple carbohydrates in her diet. I spent 45 minutes with the patient today and > 50% of the time was spent counseling the patient about hypothyroidism: its etiology, clinical manifestations, diagnosis, and management. She will return in 6 months time. Thank you for the opportunity to partake in this patients care.   Paco Mitchell MD  [facility]  [facility]

## 2017-10-11 NOTE — MR AVS SNAPSHOT
Visit Information Date & Time Provider Department Dept. Phone Encounter #  
 10/11/2017  9:50 AM Sofia Chavira MD [facility]  Follow-up Instructions Return in about 6 months (around 4/11/2018). Upcoming Health Maintenance Date Due INFLUENZA AGE 9 TO ADULT 8/1/2017 GLAUCOMA SCREENING Q2Y 2/15/2018 MEDICARE YEARLY EXAM 3/1/2018 BREAST CANCER SCRN MAMMOGRAM 7/7/2019 COLONOSCOPY 1/1/2022 DTaP/Tdap/Td series (2 - Td) 8/18/2026 Allergies as of 10/11/2017  Review Complete On: 10/11/2017 By: Julian Lim No Known Allergies Current Immunizations  Reviewed on 8/18/2016 Name Date Influenza High Dose Vaccine PF 10/9/2015 Not reviewed this visit You Were Diagnosed With   
  
 Codes Comments Acquired hypothyroidism    -  Primary ICD-10-CM: E03.9 ICD-9-CM: 244.9 Hair loss     ICD-10-CM: L65.9 ICD-9-CM: 704.00 Hirsutism     ICD-10-CM: L68.0 ICD-9-CM: 704.1 Hypoglycemic syndrome     ICD-10-CM: E16.2 ICD-9-CM: 251.2 Vitals BP Pulse Resp Height(growth percentile) Weight(growth percentile) SpO2  
 119/59 72 16 5' 4\" (1.626 m) 186 lb (84.4 kg) 98% BMI OB Status Smoking Status 31.93 kg/m2 Hysterectomy Former Smoker Vitals History BMI and BSA Data Body Mass Index Body Surface Area  
 31.93 kg/m 2 1.95 m 2 Preferred Pharmacy Pharmacy Name Phone [id number], [address] [place] [address] Your Updated Medication List  
  
   
This list is accurate as of: 10/11/17 10:49 AM.  Always use your most recent med list. amLODIPine 5 mg tablet Commonly known as:  Mayking Citron Take 1 Tab by mouth daily. amphetamine-dextroamphetamine XR 20 mg XR capsule Commonly known as:  ADDERALL XR Take 1 Cap (20 mg total) by mouth every morningIndications: ATTENTION-DEFICIT HYPERACTIVITY DISORDER Earliest Fill Date: 11/3/17. Max Daily Amount: 20 mg  
Start taking on:  11/3/2017 DULoxetine 60 mg capsule Commonly known as:  CYMBALTA Take 1 Cap by mouth daily. esomeprazole 40 mg capsule Commonly known as:  Rogelio Oscar Take 1 Cap by mouth daily. estradiol 0.5 mg tablet Commonly known as:  ESTRACE  
TAKE ONE TABLET BY MOUTH ONCE DAILY  
  
 EYE DROPS ALLERGY RELIEF OP Apply  to eye. fexofenadine 180 mg tablet Commonly known as:  Tammy Holts Take 1 Tab by mouth. fluticasone 50 mcg/actuation nasal spray Commonly known as:  Fang Nava 2 Sprays by Both Nostrils route daily. FLUZONE HIGH-DOSE 2017-18 (PF) Syrg injection Generic drug:  influenza vaccine 2017-18 (65 yrs+)(PF)  
TO BE ADMINISTERED BY PHARMACIST FOR IMMUNIZATION  
  
 ibuprofen 800 mg tablet Commonly known as:  MOTRIN Take 800 mg by mouth every eight (8) hours as needed for Pain.  
  
 levothyroxine 112 mcg tablet Commonly known as:  SYNTHROID Take 1 Tab by mouth Daily (before breakfast). Prescriptions Sent to Pharmacy Refills  
 levothyroxine (SYNTHROID) 112 mcg tablet 11 Sig: Take 1 Tab by mouth Daily (before breakfast). Class: Normal  
 Pharmacy: [address] Ph #: [phone] Route: Oral  
  
We Performed the Following FERRITIN [89958 CPT(R)] HEMOGLOBIN A1C WITH EAG [90252 CPT(R)] T3, FREE [id number] CPT(R)] TESTOSTERONE, TOTAL, FEMALE/CHILD [id number] CPT(R)] TSH AND FREE T4 [61149 CPT(R)] Follow-up Instructions Return in about 6 months (around 4/11/2018). Patient Instructions Thyroid: 
Continue taking 112 mcg daily - switch to brand name Consider taking a prenatal vitamin containing biotin. Take this in the evening with dinner (needs to be at least 4 hours after your thyroid medication). Diet instructions: Reduce the amount of carbohydrates in your diet. This means not just avoiding sweets, sodas, or desserts but also reducing the amount of bread, pasta, rice, potatoes, corn, and crackers that you eat. Do not have more than one serving of carbs per meal (for example: do not eat a sandwich and potato chips in the same meal). Focus on eating mostly protein (meat, poultry, fish, shellfish, eggs), healthy fats (avocados, nuts, cheese, olive or coconut oil) and non-starchy vegetables (greens, carrots, tomatoes, bell peppers, broccoli, brussels sprouts, green beans, etc). If you fill yourself up with these foods, you won't even want the carbs. Introducing [facility]! Dear Richa Pressley: 
Thank you for requesting a [employer] account. Our records indicate that you already have an active [employer] account. You can access your account anytime at [email or web address] Did you know that you can access your hospital and ER discharge instructions at any time in [employer]? You can also review all of your test results from your hospital stay or ER visit. Additional Information If you have questions, please visit the Frequently Asked Questions section of the [employer] website at [email or web address] Remember, [employer] is NOT to be used for urgent needs. For medical emergencies, dial 911. Now available from your iPhone and Android! Please provide this summary of care documentation to your next provider. Your primary care clinician is listed as Grover Smith. If you have any questions after today's visit, please call [phone].

## 2017-10-11 NOTE — PROGRESS NOTES
Lab Results   Component Value Date/Time    TSH 2.170 08/09/2017 11:48 AM    T4, Free 1.15 08/09/2017 11:48 AM

## 2017-10-15 LAB
EST. AVERAGE GLUCOSE BLD GHB EST-MCNC: 120 MG/DL
FERRITIN SERPL-MCNC: 53 NG/ML (ref 15–150)
HBA1C MFR BLD: 5.8 % (ref 4.8–5.6)
T3FREE SERPL-MCNC: 2.8 PG/ML (ref 2–4.4)
T4 FREE SERPL-MCNC: 1.24 NG/DL (ref 0.82–1.77)
TESTOST SERPL-MCNC: 11.8 NG/DL (ref 7–40)
TSH SERPL DL<=0.005 MIU/L-ACNC: 0.35 UIU/ML (ref 0.45–4.5)

## 2017-11-06 ENCOUNTER — TELEPHONE (OUTPATIENT)
Dept: INTERNAL MEDICINE CLINIC | Age: 72
End: 2017-11-06

## 2017-11-06 NOTE — TELEPHONE ENCOUNTER
Called pt to verify that she needs refill for Cymbalta and that it was [facility]

## 2017-11-06 NOTE — TELEPHONE ENCOUNTER
Pt is asking if we can send refills she has requested to her new pharmacy [facility].

## 2017-12-01 RX ORDER — DEXTROAMPHETAMINE SACCHARATE, AMPHETAMINE ASPARTATE MONOHYDRATE, DEXTROAMPHETAMINE SULFATE AND AMPHETAMINE SULFATE 5; 5; 5; 5 MG/1; MG/1; MG/1; MG/1
20 CAPSULE, EXTENDED RELEASE ORAL
Qty: 30 CAP | Refills: 0 | Status: SHIPPED | OUTPATIENT
Start: 2018-01-09 | End: 2018-02-27 | Stop reason: SDUPTHER

## 2017-12-01 RX ORDER — DEXTROAMPHETAMINE SACCHARATE, AMPHETAMINE ASPARTATE MONOHYDRATE, DEXTROAMPHETAMINE SULFATE AND AMPHETAMINE SULFATE 5; 5; 5; 5 MG/1; MG/1; MG/1; MG/1
20 CAPSULE, EXTENDED RELEASE ORAL
Qty: 30 CAP | Refills: 0 | Status: SHIPPED | OUTPATIENT
Start: 2017-12-09 | End: 2018-02-27 | Stop reason: SDUPTHER

## 2017-12-01 RX ORDER — DEXTROAMPHETAMINE SACCHARATE, AMPHETAMINE ASPARTATE MONOHYDRATE, DEXTROAMPHETAMINE SULFATE AND AMPHETAMINE SULFATE 5; 5; 5; 5 MG/1; MG/1; MG/1; MG/1
20 CAPSULE, EXTENDED RELEASE ORAL
Qty: 30 CAP | Refills: 0 | Status: SHIPPED | OUTPATIENT
Start: 2018-02-09 | End: 2018-02-27 | Stop reason: SDUPTHER

## 2018-02-06 RX ORDER — DULOXETIN HYDROCHLORIDE 60 MG/1
60 CAPSULE, DELAYED RELEASE ORAL DAILY
Qty: 90 CAP | Refills: 0 | Status: CANCELLED | OUTPATIENT
Start: 2018-02-06

## 2018-02-13 ENCOUNTER — OFFICE VISIT (OUTPATIENT)
Dept: INTERNAL MEDICINE CLINIC | Age: 73
End: 2018-02-13

## 2018-02-13 ENCOUNTER — HOSPITAL ENCOUNTER (OUTPATIENT)
Dept: GENERAL RADIOLOGY | Age: 73
Discharge: HOME | End: 2018-02-13
Payer: MEDICARE

## 2018-02-13 VITALS
TEMPERATURE: 97.7 F | HEART RATE: 77 BPM | DIASTOLIC BLOOD PRESSURE: 80 MMHG | RESPIRATION RATE: 17 BRPM | OXYGEN SATURATION: 95 % | BODY MASS INDEX: 30.39 KG/M2 | HEIGHT: 64 IN | SYSTOLIC BLOOD PRESSURE: 130 MMHG | WEIGHT: 178 LBS

## 2018-02-13 DIAGNOSIS — M24.80 CERVICAL SPINE CREPITUS: ICD-10-CM

## 2018-02-13 DIAGNOSIS — M54.2 POSTERIOR NECK PAIN: Primary | ICD-10-CM

## 2018-02-13 PROCEDURE — 72050 X-RAY EXAM NECK SPINE 4/5VWS: CPT

## 2018-02-13 RX ORDER — CYCLOBENZAPRINE HCL 5 MG
5 TABLET ORAL
Qty: 30 TAB | Refills: 0 | Status: SHIPPED | OUTPATIENT
Start: 2018-02-13 | End: 2018-02-27

## 2018-02-13 NOTE — PROGRESS NOTES
Pt is here c/o bilateral shoulder pain radiating to her neck and the back of her head;  Pain started about 4 days ago. Pt denied any changes in her ADL's.

## 2018-02-13 NOTE — PROGRESS NOTES
Acute Care Note    Latrice Stewart is 68 y.o. female. she presents for evaluation of Shoulder Pain; Neck Pain; and Head Pain    Neck Pain  Patient complains of neck pain. Onset of symptoms was 4 days ago, unchanged since that time. Current symptoms are pain and stiffness in the posterior cervical region (aching, cramping in character; 6/10 in severity). Patient denies numbness, tingling, paresthesias in upper extremities. Patient denies weakness, diminished  strength, lack of coordination. Radiation of pain:  Event that precipitate these symptoms: none known. Patient has had recurrent self limited episodes of neck pain in the past.  Previous treatments include: none. Of note, she does have a history of fibromyalgia and has been treated for this. Prior to Admission medications    Medication Sig Start Date End Date Taking? Authorizing Provider   DULoxetine (CYMBALTA) 60 mg capsule Take 1 Cap by mouth daily. 2/7/18  Yes Crystal Chang MD   estradiol (ESTRACE) 0.5 mg tablet TAKE ONE TABLET BY MOUTH ONCE DAILY 1/3/18  Yes Crystal Chang MD   amphetamine-dextroamphetamine XR (ADDERALL XR) 20 mg XR capsule Take 1 Cap (20 mg total) by mouth every morningIndications: Attention-Deficit Hyperactivity Disorder Earliest Fill Date: 2/9/18. Max Daily Amount: 20 mg 2/9/18 3/11/18 Yes Crystal Chang MD   esomeprazole (NEXIUM) 40 mg capsule Take 1 Cap by mouth daily. 11/6/17  Yes Crystal Chang MD   amLODIPine (NORVASC) 5 mg tablet Take 1 Tab by mouth daily. 11/6/17  Yes Crystal Chang MD   TETRAHYDROZOLINE HCL/ZINC SULF (EYE DROPS ALLERGY RELIEF OP) Apply  to eye. Yes Historical Provider   levothyroxine (SYNTHROID) 112 mcg tablet Take 1 Tab by mouth Daily (before breakfast). 10/11/17  Yes Elizabeth Adrian MD   fexofenadine TY [facility]) 180 mg tablet Take 1 Tab by mouth. 2/28/17  Yes Crystal Chang MD   ibuprofen (MOTRIN) 800 mg tablet Take 800 mg by mouth every eight (8) hours as needed for Pain.    Yes Historical Provider   fluticasone (FLONASE) 50 mcg/actuation nasal spray 2 Sprays by Both Nostrils route daily. Patient taking differently: 2 Sprays by Both Nostrils route daily as needed. 1/14/16  Yes Nilsa Logan MD   FLUZONE HIGH-DOSE 6760-06, PF, syrg injection TO BE ADMINISTERED BY PHARMACIST FOR IMMUNIZATION 9/6/17   Historical Provider         Patient Active Problem List   Diagnosis Code    Fibromyalgia M79.7    Hypothyroidism E03.9    Gastroesophageal reflux disease without esophagitis K21.9    Menopausal syndrome (hot flashes) N95.1    ADD (attention deficit disorder) F98.8    History of DVT (deep vein thrombosis) Z86.718    Need for prophylactic vaccination against Streptococcus pneumoniae (pneumococcus) Z23    Advanced care planning/counseling discussion Z71.89    Depression F32.9    HTN (hypertension) I10    Hair loss L65.9    Hirsutism L68.0    Hypoglycemic syndrome E16.2         Review of Systems   Constitutional: Negative. Respiratory: Negative. Cardiovascular: Negative. Gastrointestinal: Negative. Musculoskeletal: Positive for neck pain. Visit Vitals    /80 (BP 1 Location: Right arm, BP Patient Position: Sitting)    Pulse 77    Temp 97.7 °F (36.5 °C) (Oral)    Resp 17    Ht 5' 4\" (1.626 m)    Wt 178 lb (80.7 kg)    SpO2 95%    BMI 30.55 kg/m2       Physical Exam   Constitutional: She appears distressed. Neck: Muscular tenderness present. No rigidity. Decreased range of motion (There is some crepitus noticed with range of motion) present. No edema and no erythema present. Cardiovascular: Normal rate and regular rhythm. ASSESSMENT/PLAN  Diagnoses and all orders for this visit:    1. Posterior neck pain  -     cyclobenzaprine (FLEXERIL) 5 mg tablet; Take 1 Tab by mouth three (3) times daily as needed for Muscle Spasm(s).     2. Cervical spine crepitus  -     XR SPINE CERV 4 OR 5 V; Future       Advised the patient to call back or return to office if symptoms worsen/change/persist.   Discussed expected course/resolution/complications of diagnosis in detail with patient. Medication risks/benefits/costs/interactions/alternatives discussed with patient. The patient was given an after visit summary which includes diagnoses, current medications, & vitals. They expressed understanding with the diagnosis and plan.

## 2018-02-14 NOTE — PROGRESS NOTES
Please inform the patient that her X-ray shows some evidence of degenerative discs which could cause pain and spasms. For now, continue the medication prescribed. If things continue to worsen, she may benefit from seeing an orthopedist.  Otherwise, I will forward the information to Dr. Valeriano Miranda.      Dr. Charmaine Mccarthy

## 2018-02-27 ENCOUNTER — OFFICE VISIT (OUTPATIENT)
Dept: INTERNAL MEDICINE CLINIC | Age: 73
End: 2018-02-27

## 2018-02-27 VITALS
WEIGHT: 183.6 LBS | OXYGEN SATURATION: 96 % | HEIGHT: 64 IN | BODY MASS INDEX: 31.34 KG/M2 | DIASTOLIC BLOOD PRESSURE: 71 MMHG | SYSTOLIC BLOOD PRESSURE: 118 MMHG | RESPIRATION RATE: 16 BRPM | TEMPERATURE: 96.3 F

## 2018-02-27 DIAGNOSIS — M79.7 FIBROMYALGIA: Primary | ICD-10-CM

## 2018-02-27 DIAGNOSIS — E03.9 ACQUIRED HYPOTHYROIDISM: ICD-10-CM

## 2018-02-27 DIAGNOSIS — R73.09 ELEVATED GLUCOSE: ICD-10-CM

## 2018-02-27 DIAGNOSIS — I10 ESSENTIAL HYPERTENSION: ICD-10-CM

## 2018-02-27 DIAGNOSIS — F32.5 MAJOR DEPRESSIVE DISORDER WITH SINGLE EPISODE, IN FULL REMISSION (HCC): ICD-10-CM

## 2018-02-27 DIAGNOSIS — F90.0 ATTENTION DEFICIT HYPERACTIVITY DISORDER (ADHD), PREDOMINANTLY INATTENTIVE TYPE: ICD-10-CM

## 2018-02-27 DIAGNOSIS — E78.00 PURE HYPERCHOLESTEROLEMIA: ICD-10-CM

## 2018-02-27 DIAGNOSIS — K21.9 GASTROESOPHAGEAL REFLUX DISEASE WITHOUT ESOPHAGITIS: ICD-10-CM

## 2018-02-27 RX ORDER — DEXTROAMPHETAMINE SACCHARATE, AMPHETAMINE ASPARTATE MONOHYDRATE, DEXTROAMPHETAMINE SULFATE AND AMPHETAMINE SULFATE 5; 5; 5; 5 MG/1; MG/1; MG/1; MG/1
20 CAPSULE, EXTENDED RELEASE ORAL
Qty: 30 CAP | Refills: 0 | Status: SHIPPED | OUTPATIENT
Start: 2018-04-11 | End: 2018-05-11

## 2018-02-27 RX ORDER — DEXTROAMPHETAMINE SACCHARATE, AMPHETAMINE ASPARTATE MONOHYDRATE, DEXTROAMPHETAMINE SULFATE AND AMPHETAMINE SULFATE 5; 5; 5; 5 MG/1; MG/1; MG/1; MG/1
20 CAPSULE, EXTENDED RELEASE ORAL
Qty: 30 CAP | Refills: 0 | Status: SHIPPED | OUTPATIENT
Start: 2018-05-11 | End: 2018-06-10

## 2018-02-27 RX ORDER — DEXTROAMPHETAMINE SACCHARATE, AMPHETAMINE ASPARTATE MONOHYDRATE, DEXTROAMPHETAMINE SULFATE AND AMPHETAMINE SULFATE 5; 5; 5; 5 MG/1; MG/1; MG/1; MG/1
20 CAPSULE, EXTENDED RELEASE ORAL
Qty: 30 CAP | Refills: 0 | Status: SHIPPED | OUTPATIENT
Start: 2018-03-11 | End: 2018-04-10

## 2018-02-27 NOTE — PROGRESS NOTES
Chief Complaint   Patient presents with     Follow-up       1. Have you been to the ER, urgent care clinic since your last visit? Hospitalized since your last visit? no    2. Have you seen or consulted any other health care providers outside of the [address] [place] [address] since your last visit? Include any pap smears or colon screening.   No    Patient aware she needs an glaucoma screening does not need a referral

## 2018-02-27 NOTE — PROGRESS NOTES
HPI:  Whitney Zavaleta is a 68y.o. year old female who returns to clinic today for follow up: hypothyroid, ADD, GERD, fibromyalgia, and depression.       1.   Fibromyalgia: she was seen a few weeks ago for some left sided neck pain which has improved with NSAIDS. fibromylagia symptoms are stable with cymbalta. She continues to have some daily pain. 2. Hypothyroid: Thyroid ROS: denies fatigue weight changes, heat/cold intolerance, bowel/skin changes or CVS symptoms. 3. ADD: taking adderall xr daily and doing well with her focus. No.side effects   4. Cardiovascular: Hypertension: The home BP readings outside of office not checking . Diet and Lifestyle: generally follows a low fat low cholesterol diet, generally follows a low sodium diet. No CP or SOB with exertion. Not exercising. 5. Depression:  Treatment includes cymbalta and no other therapies. Ongoing symptoms include none. She denies depressed mood, anhedonia and No suicidal ideation. .   She experiences the following side effects from the treatment: none. 6. GERD: Taking medication daily and if misses dose has breakthrough gerd symptoms. Following gerd precautions. Current Outpatient Prescriptions   Medication Sig Dispense Refill    DULoxetine (CYMBALTA) 60 mg capsule Take 1 Cap by mouth daily. 90 Cap 0    estradiol (ESTRACE) 0.5 mg tablet TAKE ONE TABLET BY MOUTH ONCE DAILY 90 Tab 0    amphetamine-dextroamphetamine XR (ADDERALL XR) 20 mg XR capsule Take 1 Cap (20 mg total) by mouth every morningIndications: Attention-Deficit Hyperactivity Disorder Earliest Fill Date: 2/9/18. Max Daily Amount: 20 mg 30 Cap 0    esomeprazole (NEXIUM) 40 mg capsule Take 1 Cap by mouth daily. 90 Cap 1    amLODIPine (NORVASC) 5 mg tablet Take 1 Tab by mouth daily. 90 Tab 1    FLUZONE HIGH-DOSE 2017-18, PF, syrg injection TO BE ADMINISTERED BY PHARMACIST FOR IMMUNIZATION  0    TETRAHYDROZOLINE HCL/ZINC SULF (EYE DROPS ALLERGY RELIEF OP) Apply  to eye.       levothyroxine (SYNTHROID) 112 mcg tablet Take 1 Tab by mouth Daily (before breakfast). 30 Tab 11    ibuprofen (MOTRIN) 800 mg tablet Take 800 mg by mouth every eight (8) hours as needed for Pain.  fluticasone (FLONASE) 50 mcg/actuation nasal spray 2 Sprays by Both Nostrils route daily. (Patient taking differently: 2 Sprays by Both Nostrils route daily as needed.) 1 Bottle 5     No Known Allergies  Social History   Substance Use Topics    Smoking status: Former Smoker     Quit date: 1/1/1975    Smokeless tobacco: Never Used    Alcohol use No         Review of Systems   Constitutional: Negative for malaise/fatigue. Respiratory: Negative for shortness of breath. Cardiovascular: Negative for chest pain and leg swelling. Gastrointestinal: Negative for abdominal pain and heartburn. Neurological: Negative for dizziness and headaches. Physical Exam   Constitutional: She appears well-developed. No distress. /71 (BP 1 Location: Left arm, BP Patient Position: Sitting)  Temp 96.3 °F (35.7 °C) (Oral)   Resp 16  Ht 5' 4\" (1.626 m)  Wt 183 lb 9.6 oz (83.3 kg)  SpO2 96%  BMI 31.51 kg/m2   Neck: Carotid bruit is not present. No thyromegaly present. Cardiovascular: Normal rate, regular rhythm, normal heart sounds and intact distal pulses. No murmur heard. Pulmonary/Chest: Effort normal and breath sounds normal. She has no wheezes. Abdominal: Soft. Bowel sounds are normal. She exhibits no distension and no mass. There is no tenderness. Musculoskeletal: She exhibits no edema. Back:    Psychiatric: She has a normal mood and affect. Vitals reviewed. Assessment & Plan:    ICD-10-CM ICD-9-CM    1. Fibromyalgia  Stable  Continue current medication. M79.7 729.1    2. Acquired hypothyroidism  Continue current plan and check lab work. E03.9 244.9 TSH 3RD GENERATION      T4, FREE   3. Gastroesophageal reflux disease without esophagitis  Well-controlled continue current medication.  K21.9 530.81    4. Major depressive disorder with single episode, in full remission ([facility] [address]  Well-controlled continue current medication. F32.5 296.26    5. Essential hypertension  Well-controlled continue current medication. I10 401.9    6. Attention deficit hyperactivity disorder (ADHD), predominantly inattentive type  Well-controlled with Adderall XR. Continue current medication. No side effects.  reviewed today. F90.0 314.00 amphetamine-dextroamphetamine XR (ADDERALL XR) 20 mg XR capsule      amphetamine-dextroamphetamine XR (ADDERALL XR) 20 mg XR capsule      amphetamine-dextroamphetamine XR (ADDERALL XR) 20 mg XR capsule   7. Elevated glucose R73.09 790.29 HEMOGLOBIN A1C WITH EAG   8. Pure hypercholesterolemia  Please continue to work on low fat, low cholesterol diet and exercise. P22.07 353.3 METABOLIC PANEL, BASIC      LIPID PANEL   9. Posterior neck pain likely strain resolving continue current regimen ice/heat, stretching exercises, NSAIDs as needed. Follow-up Disposition:  Return in about 6 months (around 8/27/2018) for follow up. Advised her to call back or return to office if symptoms worsen/change/persist.  Discussed expected course/resolution/complications of diagnosis in detail with patient. Medication risks/benefits/costs/interactions/alternatives discussed with patient. She was given an after visit summary which includes diagnoses, current medications, & vitals. She expressed understanding with the diagnosis and plan.

## 2018-05-09 RX ORDER — DULOXETIN HYDROCHLORIDE 60 MG/1
60 CAPSULE, DELAYED RELEASE ORAL DAILY
Qty: 90 CAP | Refills: 1 | Status: SHIPPED | OUTPATIENT
Start: 2018-05-09

## 2018-05-09 RX ORDER — AMLODIPINE BESYLATE 5 MG/1
5 TABLET ORAL DAILY
Qty: 90 TAB | Refills: 1 | Status: SHIPPED | OUTPATIENT
Start: 2018-05-09

## 2018-05-09 NOTE — TELEPHONE ENCOUNTER
No refills left on these meds. Please send new rx's to [facility] in [place]. Please review referral and sign if ok.

## 2020-01-23 NOTE — TELEPHONE ENCOUNTER
----- Message from Erika Rodriguez LPN sent at 3/20/4787  9:22 AM EST -----  Regarding: FW: Referral Request  Contact: [phone]      ----- Message -----     From: Denise Moreno     Sent: 1/23/2017   7:50 AM       To: Luli Finley McCarr  Subject: Referral Request                                 Good Morning,  Dr. Justen Alaniz recommended a Rheumatologist to me. The Doctor she recommended can't see me till the end of February. Instead of waiting until then. I made an appointment with an associate in the practice named Dr. German Brown with [address] [place] [address].,  I need a referral faxed to her for my Thursday appointment.    Thank you  Lynne Dangelo Detail Level: None

## 2021-07-28 ENCOUNTER — DOCTOR'S OFFICE (OUTPATIENT)
Dept: URBAN - METROPOLITAN AREA CLINIC 157 | Facility: CLINIC | Age: 76
Setting detail: OPHTHALMOLOGY
End: 2021-07-28
Payer: COMMERCIAL

## 2021-07-28 PROCEDURE — 99213 OFFICE O/P EST LOW 20 MIN: CPT | Performed by: OPTOMETRIST

## 2021-07-28 PROCEDURE — 92133 CPTRZD OPH DX IMG PST SGM ON: CPT | Performed by: OPTOMETRIST

## 2021-07-28 ASSESSMENT — REFRACTION_MANIFEST
OD_VA1: 20/40
OS_CYLINDER: +0.50
OS_VA2: 20/30(J2)
OS_VA1: 20/30
OD_ADD: +2.50
OD_SPHERE: -1.25
OS_SPHERE: -1.00
OS_ADD: +2.50
OD_VA2: 20/30(J2)
OD_AXIS: 160
OU_VA: 20/30
OS_AXIS: 165
OD_CYLINDER: +0.50

## 2021-07-28 ASSESSMENT — SPHEQUIV_DERIVED
OD_SPHEQUIV: -2
OS_SPHEQUIV: -1.5
OS_SPHEQUIV: -0.75
OD_SPHEQUIV: -1

## 2021-07-28 ASSESSMENT — TONOMETRY
OD_IOP_MMHG: 21
OD_IOP_MMHG: 14
OS_IOP_MMHG: 19
OS_IOP_MMHG: 14

## 2021-07-28 ASSESSMENT — PACHYMETRY
OS_CT_CORRECTION: -4
OS_CT_UM: 591
OD_CT_CORRECTION: 0
OD_CT_UM: 542

## 2021-07-28 ASSESSMENT — AXIALLENGTH_DERIVED
OS_AL: 23.9823
OD_AL: 24.4963
OS_AL: 24.2881
OD_AL: 24.0834

## 2021-07-28 ASSESSMENT — REFRACTION_AUTOREFRACTION
OS_CYLINDER: +1.00
OD_SPHERE: -2.25
OD_CYLINDER: +0.50
OS_AXIS: 160
OD_AXIS: 180
OS_SPHERE: -2.00

## 2021-07-28 ASSESSMENT — KERATOMETRY
OS_AXISANGLE_DEGREES: 130
OS_K1POWER_DIOPTERS: 43.00
OD_AXISANGLE_DEGREES: 90
OD_K2POWER_DIOPTERS: 43.25
OD_K1POWER_DIOPTERS: 43.25
OS_K2POWER_DIOPTERS: 43.50

## 2021-07-28 ASSESSMENT — CONFRONTATIONAL VISUAL FIELD TEST (CVF)
OS_FINDINGS: FULL
OD_FINDINGS: FULL

## 2021-07-28 ASSESSMENT — VISUAL ACUITY
OD_BCVA: 20/30
OS_BCVA: 20/50

## 2021-10-04 ENCOUNTER — DOCTOR'S OFFICE (OUTPATIENT)
Dept: URBAN - METROPOLITAN AREA CLINIC 157 | Facility: CLINIC | Age: 76
Setting detail: OPHTHALMOLOGY
End: 2021-10-04
Payer: COMMERCIAL

## 2021-10-04 ENCOUNTER — RX ONLY (RX ONLY)
Age: 76
End: 2021-10-04

## 2021-10-04 PROCEDURE — 99213 OFFICE O/P EST LOW 20 MIN: CPT | Performed by: OPTOMETRIST

## 2021-10-04 ASSESSMENT — PACHYMETRY
OD_CT_UM: 542
OS_CT_CORRECTION: -4
OD_CT_CORRECTION: 0
OS_CT_UM: 591

## 2021-10-04 ASSESSMENT — REFRACTION_MANIFEST
OS_VA2: 20/30(J2)
OS_AXIS: 165
OD_ADD: +2.50
OU_VA: 20/30
OD_CYLINDER: +0.50
OS_CYLINDER: +0.50
OS_VA1: 20/30
OD_AXIS: 160
OD_VA2: 20/30(J2)
OS_SPHERE: -1.00
OD_VA1: 20/40
OS_ADD: +2.50
OD_SPHERE: -1.25

## 2021-10-04 ASSESSMENT — SPHEQUIV_DERIVED
OS_SPHEQUIV: -1.5
OD_SPHEQUIV: -2
OS_SPHEQUIV: -0.75
OD_SPHEQUIV: -1

## 2021-10-04 ASSESSMENT — KERATOMETRY
OS_K1POWER_DIOPTERS: 43.00
OD_K1POWER_DIOPTERS: 43.25
OD_K2POWER_DIOPTERS: 43.25
OD_AXISANGLE_DEGREES: 90
OS_AXISANGLE_DEGREES: 130
OS_K2POWER_DIOPTERS: 43.50

## 2021-10-04 ASSESSMENT — CONFRONTATIONAL VISUAL FIELD TEST (CVF)
OD_FINDINGS: FULL
OS_FINDINGS: FULL

## 2021-10-04 ASSESSMENT — REFRACTION_AUTOREFRACTION
OD_AXIS: 180
OD_CYLINDER: +0.50
OS_AXIS: 160
OS_SPHERE: -2.00
OS_CYLINDER: +1.00
OD_SPHERE: -2.25

## 2021-10-04 ASSESSMENT — TONOMETRY
OS_IOP_MMHG: 15
OD_IOP_MMHG: 17

## 2021-10-04 ASSESSMENT — AXIALLENGTH_DERIVED
OD_AL: 24.0834
OS_AL: 23.9823
OD_AL: 24.4963
OS_AL: 24.2881

## 2021-10-04 ASSESSMENT — TEAR BREAK UP TIME (TBUT)
OD_TBUT: 1 SEC
OS_TBUT: 1 SEC

## 2021-10-04 ASSESSMENT — VISUAL ACUITY
OS_BCVA: 20/70
OD_BCVA: 20/40

## 2021-10-18 ENCOUNTER — DOCTOR'S OFFICE (OUTPATIENT)
Dept: URBAN - METROPOLITAN AREA CLINIC 157 | Facility: CLINIC | Age: 76
Setting detail: OPHTHALMOLOGY
End: 2021-10-18
Payer: COMMERCIAL

## 2021-10-18 PROCEDURE — 92012 INTRM OPH EXAM EST PATIENT: CPT | Performed by: OPHTHALMOLOGY

## 2021-10-18 ASSESSMENT — SPHEQUIV_DERIVED
OS_SPHEQUIV: -0.75
OD_SPHEQUIV: -2.125
OS_SPHEQUIV: -1.75
OD_SPHEQUIV: -1
OS_SPHEQUIV: -0.75
OD_SPHEQUIV: -1

## 2021-10-18 ASSESSMENT — TEAR BREAK UP TIME (TBUT)
OS_TBUT: 1 SEC
OD_TBUT: 1 SEC

## 2021-10-18 ASSESSMENT — REFRACTION_AUTOREFRACTION
OS_CYLINDER: +1.00
OS_SPHERE: -2.25
OD_CYLINDER: +0.75
OD_SPHERE: -2.50
OS_AXIS: 150
OD_AXIS: 175

## 2021-10-18 ASSESSMENT — PACHYMETRY
OS_CT_CORRECTION: -4
OD_CT_CORRECTION: 0
OS_CT_UM: 591
OD_CT_UM: 542

## 2021-10-18 ASSESSMENT — REFRACTION_MANIFEST
OS_SPHERE: -1.00
OD_VA1: 20/40
OD_CYLINDER: +0.50
OD_SPHERE: -1.25
OD_SPHERE: -1.25
OS_CYLINDER: +0.50
OS_VA1: 20/30
OS_ADD: +2.50
OD_ADD: +2.50
OD_VA2: 20/30(J2)
OS_AXIS: 165
OD_AXIS: 160
OD_VA1: 20/40-1
OD_CYLINDER: +0.50
OS_CYLINDER: +0.50
OS_VA2: 20/30(J2)
OS_SPHERE: -1.00
OS_AXIS: 165
OU_VA: 20/30
OS_VA1: 20/40-2
OD_AXIS: 160

## 2021-10-18 ASSESSMENT — LID POSITION - PTOSIS
OD_PTOSIS: 1+
OS_PTOSIS: 2+

## 2021-10-18 ASSESSMENT — VISUAL ACUITY
OD_BCVA: 20/40
OS_BCVA: 20/40

## 2021-10-18 ASSESSMENT — KERATOMETRY
OD_AXISANGLE_DEGREES: 90
OS_K2POWER_DIOPTERS: 43.50
OS_K1POWER_DIOPTERS: 43.00
OD_K1POWER_DIOPTERS: 43.25
OD_K2POWER_DIOPTERS: 43.25
OS_AXISANGLE_DEGREES: 130

## 2021-10-18 ASSESSMENT — AXIALLENGTH_DERIVED
OD_AL: 24.549
OD_AL: 24.0834
OS_AL: 24.3918
OD_AL: 24.0834
OS_AL: 23.9823
OS_AL: 23.9823

## 2021-10-18 ASSESSMENT — TONOMETRY
OS_IOP_MMHG: 15
OS_IOP_MMHG: 18
OD_IOP_MMHG: 21

## 2021-10-18 ASSESSMENT — LID POSITION - DERMATOCHALASIS
OD_DERMATOCHALASIS: 2+
OS_DERMATOCHALASIS: 2+ 3+

## 2021-11-02 ENCOUNTER — DOCTOR'S OFFICE (OUTPATIENT)
Dept: URBAN - METROPOLITAN AREA CLINIC 157 | Facility: CLINIC | Age: 76
Setting detail: OPHTHALMOLOGY
End: 2021-11-02
Payer: COMMERCIAL

## 2021-11-02 PROCEDURE — 92136 OPHTHALMIC BIOMETRY: CPT | Performed by: OPHTHALMOLOGY

## 2021-11-09 ENCOUNTER — AMBUL SURGICAL CARE (OUTPATIENT)
Dept: URBAN - METROPOLITAN AREA CLINIC 158 | Facility: CLINIC | Age: 76
Setting detail: OPHTHALMOLOGY
End: 2021-11-09
Payer: COMMERCIAL

## 2021-11-09 PROCEDURE — 66984 XCAPSL CTRC RMVL W/O ECP: CPT | Performed by: OPHTHALMOLOGY

## 2021-11-09 PROCEDURE — 0191T INSERTION OF ANTERIOR SEGMENT AQUEOUS DRAINAGE DEVICE, WITHOUT EXTRAOCULAR RESERVOIR; INTERNAL APPROACH, INTO THE TRABECULAR MESHWORK.: CPT | Performed by: OPHTHALMOLOGY

## 2021-11-09 PROCEDURE — 0356T DEXTENZA ADMINISTRATION: CPT | Performed by: OPHTHALMOLOGY

## 2021-11-10 ENCOUNTER — DOCTOR'S OFFICE (OUTPATIENT)
Dept: URBAN - METROPOLITAN AREA CLINIC 157 | Facility: CLINIC | Age: 76
Setting detail: OPHTHALMOLOGY
End: 2021-11-10
Payer: COMMERCIAL

## 2021-11-10 PROCEDURE — 99024 POSTOP FOLLOW-UP VISIT: CPT | Performed by: OPHTHALMOLOGY

## 2021-11-10 ASSESSMENT — REFRACTION_MANIFEST
OD_ADD: +2.50
OS_AXIS: 165
OD_AXIS: 160
OS_SPHERE: -1.00
OS_CYLINDER: +0.50
OD_VA1: 20/40
OD_VA1: 20/40-1
OD_CYLINDER: +0.50
OS_ADD: +2.50
OU_VA: 20/30
OD_AXIS: 160
OD_VA2: 20/30(J2)
OS_SPHERE: -1.00
OS_VA1: 20/40-2
OD_CYLINDER: +0.50
OD_SPHERE: -1.25
OD_SPHERE: -1.25
OS_VA1: 20/30
OS_CYLINDER: +0.50
OS_AXIS: 165
OS_VA2: 20/30(J2)

## 2021-11-10 ASSESSMENT — KERATOMETRY
OD_K2POWER_DIOPTERS: 43.25
OS_K1POWER_DIOPTERS: 43.00
OD_AXISANGLE_DEGREES: 90
OS_K2POWER_DIOPTERS: 43.50
OS_AXISANGLE_DEGREES: 130
OD_K1POWER_DIOPTERS: 43.25

## 2021-11-10 ASSESSMENT — VISUAL ACUITY
OD_BCVA: 20/40
OS_BCVA: 20/30+1

## 2021-11-10 ASSESSMENT — PACHYMETRY
OD_CT_UM: 542
OS_CT_UM: 591
OD_CT_CORRECTION: 0
OS_CT_CORRECTION: -4

## 2021-11-10 ASSESSMENT — TONOMETRY
OS_IOP_MMHG: 17
OD_IOP_MMHG: 15

## 2021-11-10 ASSESSMENT — REFRACTION_AUTOREFRACTION
OS_AXIS: 150
OD_CYLINDER: +0.75
OS_CYLINDER: +1.00
OD_AXIS: 175
OS_SPHERE: -2.25
OD_SPHERE: -2.50

## 2021-11-10 ASSESSMENT — SPHEQUIV_DERIVED
OD_SPHEQUIV: -1
OS_SPHEQUIV: -1.75
OD_SPHEQUIV: -1
OS_SPHEQUIV: -0.75
OD_SPHEQUIV: -2.125
OS_SPHEQUIV: -0.75

## 2021-11-10 ASSESSMENT — AXIALLENGTH_DERIVED
OS_AL: 23.9823
OD_AL: 24.549
OS_AL: 24.3918
OS_AL: 23.9823
OD_AL: 24.0834
OD_AL: 24.0834

## 2021-11-10 ASSESSMENT — TEAR BREAK UP TIME (TBUT)
OD_TBUT: 1 SEC
OS_TBUT: 1 SEC

## 2021-11-17 ENCOUNTER — DOCTOR'S OFFICE (OUTPATIENT)
Dept: URBAN - METROPOLITAN AREA CLINIC 157 | Facility: CLINIC | Age: 76
Setting detail: OPHTHALMOLOGY
End: 2021-11-17
Payer: COMMERCIAL

## 2021-11-17 PROCEDURE — 92136 OPHTHALMIC BIOMETRY: CPT | Performed by: OPHTHALMOLOGY

## 2021-11-17 PROCEDURE — 99024 POSTOP FOLLOW-UP VISIT: CPT | Performed by: OPHTHALMOLOGY

## 2021-11-17 ASSESSMENT — KERATOMETRY
OS_AXISANGLE_DEGREES: 130
OD_AXISANGLE_DEGREES: 90
OD_K2POWER_DIOPTERS: 43.25
OS_K2POWER_DIOPTERS: 43.50
OD_K1POWER_DIOPTERS: 43.25
OS_K1POWER_DIOPTERS: 43.00

## 2021-11-17 ASSESSMENT — REFRACTION_MANIFEST
OS_AXIS: 165
OD_VA1: 20/40-1
OS_ADD: +2.50
OD_SPHERE: -1.25
OS_CYLINDER: +0.50
OD_AXIS: 160
OD_CYLINDER: +0.50
OS_VA1: 20/30
OD_VA1: 20/20-2
OD_VA1: 20/40
OD_CYLINDER: +0.50
OS_VA1: 20/40-2
OD_SPHERE: -1.25
OD_SPHERE: PLANO
OS_VA1: 20/40
OS_CYLINDER: +0.50
OD_AXIS: 160
OS_SPHERE: -1.00
OU_VA: 20/30
OD_ADD: +2.50
OS_SPHERE: -1.00
OD_VA2: 20/30(J2)
OS_CYLINDER: +0.50
OS_AXIS: 165
OS_SPHERE: -1.00
OS_AXIS: 165
OS_VA2: 20/30(J2)

## 2021-11-17 ASSESSMENT — SPHEQUIV_DERIVED
OS_SPHEQUIV: -0.75
OD_SPHEQUIV: -1
OD_SPHEQUIV: -1
OS_SPHEQUIV: -1.875

## 2021-11-17 ASSESSMENT — REFRACTION_AUTOREFRACTION
OD_SPHERE: --0.25
OS_CYLINDER: +0.75
OS_AXIS: 160
OD_CYLINDER: +0.25
OS_SPHERE: -2.25
OD_AXIS: 005

## 2021-11-17 ASSESSMENT — TONOMETRY
OS_IOP_MMHG: 18
OD_IOP_MMHG: 15
OD_IOP_MMHG: 15
OS_IOP_MMHG: 20

## 2021-11-17 ASSESSMENT — AXIALLENGTH_DERIVED
OS_AL: 23.9823
OD_AL: 24.0834
OS_AL: 24.4439
OS_AL: 23.9823
OD_AL: 24.0834
OS_AL: 23.9823

## 2021-11-17 ASSESSMENT — PACHYMETRY
OD_CT_CORRECTION: 0
OD_CT_UM: 542
OS_CT_UM: 591
OS_CT_CORRECTION: -4

## 2021-11-17 ASSESSMENT — VISUAL ACUITY
OS_BCVA: 20/25
OD_BCVA: 20/40

## 2021-11-17 ASSESSMENT — TEAR BREAK UP TIME (TBUT)
OS_TBUT: 1 SEC
OD_TBUT: 1 SEC

## 2021-11-23 ENCOUNTER — AMBUL SURGICAL CARE (OUTPATIENT)
Dept: URBAN - METROPOLITAN AREA CLINIC 158 | Facility: CLINIC | Age: 76
Setting detail: OPHTHALMOLOGY
End: 2021-11-23
Payer: COMMERCIAL

## 2021-11-23 PROCEDURE — 0356T DEXTENZA ADMINISTRATION: CPT | Performed by: OPHTHALMOLOGY

## 2021-11-23 PROCEDURE — 66984 XCAPSL CTRC RMVL W/O ECP: CPT | Performed by: OPHTHALMOLOGY

## 2021-11-24 ENCOUNTER — DOCTOR'S OFFICE (OUTPATIENT)
Dept: URBAN - METROPOLITAN AREA CLINIC 157 | Facility: CLINIC | Age: 76
Setting detail: OPHTHALMOLOGY
End: 2021-11-24
Payer: COMMERCIAL

## 2021-11-24 PROCEDURE — 99024 POSTOP FOLLOW-UP VISIT: CPT | Performed by: OPHTHALMOLOGY

## 2021-11-24 ASSESSMENT — REFRACTION_MANIFEST
OD_VA1: 20/40-1
OS_CYLINDER: +0.50
OS_AXIS: 165
OS_SPHERE: -1.00
OS_CYLINDER: +0.50
OS_VA2: 20/30(J2)
OD_SPHERE: -1.25
OU_VA: 20/30
OD_SPHERE: PLANO
OD_ADD: +2.50
OS_VA1: 20/30
OS_AXIS: 165
OD_AXIS: 160
OS_CYLINDER: +0.50
OD_VA2: 20/30(J2)
OS_ADD: +2.50
OD_VA1: 20/40
OD_SPHERE: -1.25
OS_SPHERE: -1.00
OS_VA1: 20/40-2
OS_VA1: 20/40
OD_VA1: 20/20-2
OD_CYLINDER: +0.50
OD_AXIS: 160
OS_SPHERE: -1.00
OD_CYLINDER: +0.50
OS_AXIS: 165

## 2021-11-24 ASSESSMENT — AXIALLENGTH_DERIVED
OS_AL: 23.9823
OS_AL: 23.9823
OS_AL: 24.4439
OD_AL: 24.0834
OS_AL: 23.9823
OD_AL: 24.0834

## 2021-11-24 ASSESSMENT — REFRACTION_AUTOREFRACTION
OD_CYLINDER: +0.25
OS_CYLINDER: +0.75
OD_AXIS: 005
OS_SPHERE: -2.25
OS_AXIS: 160
OD_SPHERE: --0.25

## 2021-11-24 ASSESSMENT — KERATOMETRY
OD_K1POWER_DIOPTERS: 43.25
OS_K1POWER_DIOPTERS: 43.00
OD_K2POWER_DIOPTERS: 43.25
OS_AXISANGLE_DEGREES: 130
OS_K2POWER_DIOPTERS: 43.50
OD_AXISANGLE_DEGREES: 90

## 2021-11-24 ASSESSMENT — SPHEQUIV_DERIVED
OS_SPHEQUIV: -1.875
OD_SPHEQUIV: -1
OS_SPHEQUIV: -0.75
OD_SPHEQUIV: -1

## 2021-11-24 ASSESSMENT — PACHYMETRY
OS_CT_UM: 591
OS_CT_CORRECTION: -4
OD_CT_UM: 542
OD_CT_CORRECTION: 0

## 2021-11-24 ASSESSMENT — CORNEAL EDEMA CLINICAL DESCRIPTION: OS_CORNEALEDEMA: 2+

## 2021-11-24 ASSESSMENT — VISUAL ACUITY
OS_BCVA: 20/25
OD_BCVA: 20/80

## 2021-11-24 ASSESSMENT — TONOMETRY
OD_IOP_MMHG: 12
OD_IOP_MMHG: 12

## 2021-11-29 ENCOUNTER — DOCTOR'S OFFICE (OUTPATIENT)
Dept: URBAN - METROPOLITAN AREA CLINIC 157 | Facility: CLINIC | Age: 76
Setting detail: OPHTHALMOLOGY
End: 2021-11-29
Payer: COMMERCIAL

## 2021-11-29 PROCEDURE — 99024 POSTOP FOLLOW-UP VISIT: CPT | Performed by: OPHTHALMOLOGY

## 2021-11-29 ASSESSMENT — REFRACTION_MANIFEST
OD_SPHERE: -1.25
OD_AXIS: 160
OS_SPHERE: -1.00
OD_VA1: 20/20-2
OS_ADD: +2.50
OD_VA2: 20/30(J2)
OS_VA1: 20/30
OS_VA1: 20/40-2
OU_VA: 20/30
OS_CYLINDER: +0.50
OD_AXIS: 160
OD_VA1: 20/40-1
OD_CYLINDER: +0.50
OD_SPHERE: -1.25
OD_SPHERE: PLANO
OS_SPHERE: -1.00
OS_AXIS: 165
OS_VA2: 20/30(J2)
OS_CYLINDER: +0.50
OD_ADD: +2.50
OS_CYLINDER: +0.50
OS_AXIS: 165
OS_AXIS: 165
OD_CYLINDER: +0.50
OS_VA1: 20/40
OS_SPHERE: -1.00
OD_VA1: 20/40

## 2021-11-29 ASSESSMENT — KERATOMETRY
OD_K2POWER_DIOPTERS: 43.25
OD_AXISANGLE_DEGREES: 079
OD_K1POWER_DIOPTERS: 43.00
OS_K2POWER_DIOPTERS: 43.75
OS_AXISANGLE_DEGREES: 127
OS_K1POWER_DIOPTERS: 42.50

## 2021-11-29 ASSESSMENT — AXIALLENGTH_DERIVED
OS_AL: 24.0298
OD_AL: 24.1313
OS_AL: 23.7305
OS_AL: 24.0298
OS_AL: 24.0298
OD_AL: 24.1313
OD_AL: 23.8792

## 2021-11-29 ASSESSMENT — TONOMETRY
OD_IOP_MMHG: 13
OS_IOP_MMHG: 20
OD_IOP_MMHG: 15

## 2021-11-29 ASSESSMENT — SPHEQUIV_DERIVED
OS_SPHEQUIV: -0.75
OD_SPHEQUIV: -1
OD_SPHEQUIV: -0.375
OD_SPHEQUIV: -1
OS_SPHEQUIV: 0
OS_SPHEQUIV: -0.75
OS_SPHEQUIV: -0.75

## 2021-11-29 ASSESSMENT — PACHYMETRY
OS_CT_CORRECTION: -4
OD_CT_UM: 542
OS_CT_UM: 591
OD_CT_CORRECTION: 0

## 2021-11-29 ASSESSMENT — REFRACTION_AUTOREFRACTION
OS_AXIS: 122
OD_SPHERE: -0.75
OD_CYLINDER: +0.75
OD_AXIS: 160
OS_CYLINDER: +1.00
OS_SPHERE: -0.50

## 2021-11-29 ASSESSMENT — CORNEAL EDEMA CLINICAL DESCRIPTION: OS_CORNEALEDEMA: 2+

## 2021-11-29 ASSESSMENT — VISUAL ACUITY
OD_BCVA: 20/30-1
OS_BCVA: 20/25+2

## 2021-12-20 ENCOUNTER — DOCTOR'S OFFICE (OUTPATIENT)
Dept: URBAN - METROPOLITAN AREA CLINIC 157 | Facility: CLINIC | Age: 76
Setting detail: OPHTHALMOLOGY
End: 2021-12-20
Payer: COMMERCIAL

## 2021-12-20 PROCEDURE — 99024 POSTOP FOLLOW-UP VISIT: CPT | Performed by: OPTOMETRIST

## 2021-12-20 ASSESSMENT — REFRACTION_MANIFEST
OD_AXIS: 160
OD_CYLINDER: +0.50
OS_VA2: 20/20
OS_SPHERE: -1.00
OD_CYLINDER: +0.50
OS_VA1: 20/30
OS_ADD: +2.50
OS_AXIS: 165
OS_CYLINDER: +0.75
OD_ADD: +2.50
OD_VA1: 20/20
OS_SPHERE: PLANO
OS_AXIS: 165
OS_CYLINDER: +0.50
OS_VA2: 20/30(J2)
OS_SPHERE: -1.00
OD_ADD: +2.50
OS_VA1: 20/40-2
OU_VA: 20/30
OD_AXIS: 160
OD_SPHERE: -1.25
OD_VA1: 20/40-1
OD_VA1: 20/40
OS_CYLINDER: +0.50
OD_SPHERE: PLANO
OD_VA2: 20/30(J2)
OS_VA1: 20/20
OD_CYLINDER: SPH
OD_VA2: 20/20
OD_SPHERE: -1.25
OS_ADD: +2.50
OS_AXIS: 145

## 2021-12-20 ASSESSMENT — SPHEQUIV_DERIVED
OS_SPHEQUIV: -0.75
OS_SPHEQUIV: 0.375
OD_SPHEQUIV: -0.375
OD_SPHEQUIV: -1
OD_SPHEQUIV: -1
OS_SPHEQUIV: -0.75

## 2021-12-20 ASSESSMENT — PACHYMETRY
OS_CT_CORRECTION: -4
OD_CT_CORRECTION: 0
OD_CT_UM: 542
OS_CT_UM: 591

## 2021-12-20 ASSESSMENT — AXIALLENGTH_DERIVED
OD_AL: 24.1313
OS_AL: 23.9823
OD_AL: 24.1313
OD_AL: 23.8792
OS_AL: 23.9823
OS_AL: 23.5378

## 2021-12-20 ASSESSMENT — KERATOMETRY
OS_AXISANGLE_DEGREES: 115
OS_K2POWER_DIOPTERS: 43.75
OS_K1POWER_DIOPTERS: 42.75
OD_K1POWER_DIOPTERS: 43.00
OD_AXISANGLE_DEGREES: 135
OD_K2POWER_DIOPTERS: 43.25

## 2021-12-20 ASSESSMENT — REFRACTION_AUTOREFRACTION
OS_CYLINDER: +0.75
OD_SPHERE: -0.50
OS_AXIS: 135
OS_SPHERE: 0.00
OD_CYLINDER: +0.25
OD_AXIS: 175

## 2021-12-20 ASSESSMENT — TONOMETRY
OS_IOP_MMHG: 20
OD_IOP_MMHG: 15
OD_IOP_MMHG: 15

## 2021-12-20 ASSESSMENT — LID POSITION - DERMATOCHALASIS
OS_DERMATOCHALASIS: 2+ 3+
OD_DERMATOCHALASIS: 2+

## 2021-12-20 ASSESSMENT — VISUAL ACUITY
OS_BCVA: 20/25
OD_BCVA: 20/30-1

## 2021-12-20 ASSESSMENT — TEAR BREAK UP TIME (TBUT)
OD_TBUT: 1+
OS_TBUT: 1+

## 2021-12-20 ASSESSMENT — LID POSITION - PTOSIS
OD_PTOSIS: 1+
OS_PTOSIS: 2+

## 2021-12-20 ASSESSMENT — CONFRONTATIONAL VISUAL FIELD TEST (CVF)
OD_FINDINGS: FULL
OS_FINDINGS: FULL

## 2022-03-18 ENCOUNTER — DOCTOR'S OFFICE (OUTPATIENT)
Dept: URBAN - METROPOLITAN AREA CLINIC 157 | Facility: CLINIC | Age: 77
Setting detail: OPHTHALMOLOGY
End: 2022-03-18
Payer: COMMERCIAL

## 2022-03-18 PROCEDURE — 99213 OFFICE O/P EST LOW 20 MIN: CPT | Performed by: OPTOMETRIST

## 2022-03-18 ASSESSMENT — REFRACTION_MANIFEST
OD_VA1: 20/40
OS_VA2: 20/30(J2)
OD_CYLINDER: SPH
OS_ADD: +2.50
OS_ADD: +2.50
OD_VA1: 20/20
OD_AXIS: 160
OS_VA1: 20/20
OD_SPHERE: -1.25
OD_CYLINDER: +0.50
OD_ADD: +2.50
OS_AXIS: 165
OD_VA2: 20/20
OD_ADD: +2.50
OU_VA: 20/30
OS_VA2: 20/20
OS_CYLINDER: +0.75
OS_VA1: 20/40-2
OS_SPHERE: PLANO
OD_AXIS: 160
OS_CYLINDER: +0.50
OD_CYLINDER: +0.50
OS_SPHERE: -1.00
OS_AXIS: 145
OS_VA1: 20/30
OD_VA1: 20/40-1
OD_VA2: 20/30(J2)
OS_AXIS: 165
OS_SPHERE: -1.00
OS_CYLINDER: +0.50
OD_SPHERE: PLANO
OD_SPHERE: -1.25

## 2022-03-18 ASSESSMENT — LID POSITION - DERMATOCHALASIS
OS_DERMATOCHALASIS: 2+ 3+
OD_DERMATOCHALASIS: 2+

## 2022-03-18 ASSESSMENT — AXIALLENGTH_DERIVED
OS_AL: 23.9349
OD_AL: 23.7798
OD_AL: 24.1313
OD_AL: 24.1313
OS_AL: 23.9349
OS_AL: 23.5891

## 2022-03-18 ASSESSMENT — PACHYMETRY
OD_CT_CORRECTION: 0
OS_CT_CORRECTION: -4
OD_CT_UM: 542
OS_CT_UM: 591

## 2022-03-18 ASSESSMENT — KERATOMETRY
OD_K1POWER_DIOPTERS: 43.00
OD_K2POWER_DIOPTERS: 43.25
OS_K2POWER_DIOPTERS: 43.75
OD_AXISANGLE_DEGREES: 068
OS_AXISANGLE_DEGREES: 116
OS_K1POWER_DIOPTERS: 43.00

## 2022-03-18 ASSESSMENT — REFRACTION_AUTOREFRACTION
OS_SPHERE: -0.25
OD_AXIS: 017
OS_AXIS: 142
OD_SPHERE: -0.25
OS_CYLINDER: +0.75
OD_CYLINDER: +0.25

## 2022-03-18 ASSESSMENT — SPHEQUIV_DERIVED
OD_SPHEQUIV: -0.125
OS_SPHEQUIV: -0.75
OD_SPHEQUIV: -1
OS_SPHEQUIV: 0.125
OS_SPHEQUIV: -0.75
OD_SPHEQUIV: -1

## 2022-03-18 ASSESSMENT — TEAR BREAK UP TIME (TBUT)
OS_TBUT: 1+
OD_TBUT: 1+

## 2022-03-18 ASSESSMENT — LID POSITION - PTOSIS
OS_PTOSIS: 2+
OD_PTOSIS: 1+

## 2022-03-18 ASSESSMENT — VISUAL ACUITY
OS_BCVA: 20/25-1
OD_BCVA: 20/25

## 2022-03-18 ASSESSMENT — CONFRONTATIONAL VISUAL FIELD TEST (CVF)
OD_FINDINGS: FULL
OS_FINDINGS: FULL

## 2022-03-18 ASSESSMENT — TONOMETRY
OD_IOP_MMHG: 13
OS_IOP_MMHG: 13

## 2022-03-22 ENCOUNTER — DOCTOR'S OFFICE (OUTPATIENT)
Dept: URBAN - METROPOLITAN AREA CLINIC 157 | Facility: CLINIC | Age: 77
Setting detail: OPHTHALMOLOGY
End: 2022-03-22
Payer: COMMERCIAL

## 2022-03-22 ENCOUNTER — RX ONLY (RX ONLY)
Age: 77
End: 2022-03-22

## 2022-03-22 PROCEDURE — 99212 OFFICE O/P EST SF 10 MIN: CPT | Performed by: OPTOMETRIST

## 2022-03-22 ASSESSMENT — REFRACTION_MANIFEST
OD_VA1: 20/40-1
OS_VA1: 20/20
OD_CYLINDER: +0.50
OD_SPHERE: PLANO
OS_VA2: 20/30(J2)
OS_CYLINDER: +0.50
OD_AXIS: 160
OD_VA1: 20/20
OS_VA2: 20/20
OS_ADD: +2.50
OD_SPHERE: -1.25
OD_ADD: +2.50
OS_VA1: 20/30
OD_AXIS: 160
OS_SPHERE: -1.00
OD_VA1: 20/40
OD_VA2: 20/20
OD_VA2: 20/30(J2)
OU_VA: 20/30
OS_CYLINDER: +0.50
OS_ADD: +2.50
OS_AXIS: 145
OS_SPHERE: PLANO
OD_ADD: +2.50
OS_SPHERE: -1.00
OS_AXIS: 165
OD_CYLINDER: SPH
OD_SPHERE: -1.25
OS_AXIS: 165
OS_CYLINDER: +0.75
OS_VA1: 20/40-2
OD_CYLINDER: +0.50

## 2022-03-22 ASSESSMENT — REFRACTION_AUTOREFRACTION
OD_AXIS: 017
OS_AXIS: 142
OS_CYLINDER: +0.75
OD_SPHERE: -0.25
OD_CYLINDER: +0.25
OS_SPHERE: -0.25

## 2022-03-22 ASSESSMENT — LID POSITION - DERMATOCHALASIS
OD_DERMATOCHALASIS: 2+
OS_DERMATOCHALASIS: 2+ 3+

## 2022-03-22 ASSESSMENT — AXIALLENGTH_DERIVED
OD_AL: 24.1313
OS_AL: 23.9349
OS_AL: 23.5891
OS_AL: 23.9349
OD_AL: 24.1313
OD_AL: 23.7798

## 2022-03-22 ASSESSMENT — LID POSITION - PTOSIS
OS_PTOSIS: 2+
OD_PTOSIS: 1+

## 2022-03-22 ASSESSMENT — VISUAL ACUITY
OS_BCVA: 20/25
OD_BCVA: 20/25

## 2022-03-22 ASSESSMENT — KERATOMETRY
OD_AXISANGLE_DEGREES: 068
OS_K2POWER_DIOPTERS: 43.75
OD_K1POWER_DIOPTERS: 43.00
OD_K2POWER_DIOPTERS: 43.25
OS_K1POWER_DIOPTERS: 43.00
OS_AXISANGLE_DEGREES: 116

## 2022-03-22 ASSESSMENT — SPHEQUIV_DERIVED
OD_SPHEQUIV: -1
OS_SPHEQUIV: -0.75
OS_SPHEQUIV: -0.75
OS_SPHEQUIV: 0.125
OD_SPHEQUIV: -0.125
OD_SPHEQUIV: -1

## 2022-03-22 ASSESSMENT — PACHYMETRY
OD_CT_UM: 542
OS_CT_UM: 591
OD_CT_CORRECTION: 0
OS_CT_CORRECTION: -4

## 2022-03-22 ASSESSMENT — CONFRONTATIONAL VISUAL FIELD TEST (CVF)
OD_FINDINGS: FULL
OS_FINDINGS: FULL

## 2022-03-22 ASSESSMENT — TONOMETRY
OS_IOP_MMHG: 12
OD_IOP_MMHG: 11

## 2022-03-22 ASSESSMENT — TEAR BREAK UP TIME (TBUT)
OS_TBUT: 1+
OD_TBUT: 1+

## 2022-05-03 ENCOUNTER — DOCTOR'S OFFICE (OUTPATIENT)
Dept: URBAN - METROPOLITAN AREA CLINIC 157 | Facility: CLINIC | Age: 77
Setting detail: OPHTHALMOLOGY
End: 2022-05-03
Payer: COMMERCIAL

## 2022-05-03 PROCEDURE — 92133 CPTRZD OPH DX IMG PST SGM ON: CPT | Performed by: OPTOMETRIST

## 2022-05-03 PROCEDURE — 92020 GONIOSCOPY: CPT | Performed by: OPTOMETRIST

## 2022-05-03 PROCEDURE — 99213 OFFICE O/P EST LOW 20 MIN: CPT | Performed by: OPTOMETRIST

## 2022-05-03 ASSESSMENT — VISUAL ACUITY
OD_BCVA: 20/25-1
OS_BCVA: 20/25-1

## 2022-05-03 ASSESSMENT — REFRACTION_MANIFEST
OD_CYLINDER: +0.50
OS_SPHERE: PLANO
OS_VA2: 20/20
OS_ADD: +2.50
OD_VA1: 20/40-1
OS_AXIS: 165
OS_CYLINDER: +0.50
OS_SPHERE: -1.00
OD_SPHERE: -1.25
OS_VA1: 20/40-2
OD_ADD: +2.50
OS_CYLINDER: +0.75
OS_SPHERE: -1.00
OD_VA1: 20/20
OD_SPHERE: PLANO
OS_AXIS: 145
OD_AXIS: 160
OS_VA1: 20/30
OS_AXIS: 165
OU_VA: 20/30
OS_ADD: +2.50
OD_VA2: 20/30(J2)
OS_VA1: 20/20
OD_VA2: 20/20
OS_VA2: 20/30(J2)
OD_CYLINDER: SPH
OD_SPHERE: -1.25
OD_ADD: +2.50
OS_CYLINDER: +0.50
OD_VA1: 20/40
OD_CYLINDER: +0.50
OD_AXIS: 160

## 2022-05-03 ASSESSMENT — REFRACTION_AUTOREFRACTION
OD_SPHERE: -0.75
OD_AXIS: 170
OD_CYLINDER: +0.50
OS_AXIS: 140
OS_CYLINDER: +0.75
OS_SPHERE: -0.25

## 2022-05-03 ASSESSMENT — AXIALLENGTH_DERIVED
OD_AL: 23.9765
OS_AL: 23.9349
OD_AL: 24.1794
OS_AL: 23.9349
OD_AL: 24.1794
OS_AL: 23.5891

## 2022-05-03 ASSESSMENT — CONFRONTATIONAL VISUAL FIELD TEST (CVF)
OS_FINDINGS: FULL
OD_FINDINGS: FULL

## 2022-05-03 ASSESSMENT — SPHEQUIV_DERIVED
OD_SPHEQUIV: -1
OS_SPHEQUIV: 0.125
OS_SPHEQUIV: -0.75
OD_SPHEQUIV: -1
OS_SPHEQUIV: -0.75
OD_SPHEQUIV: -0.5

## 2022-05-03 ASSESSMENT — TONOMETRY
OS_IOP_MMHG: 14
OS_IOP_MMHG: 15
OD_IOP_MMHG: 16
OD_IOP_MMHG: 16

## 2022-05-03 ASSESSMENT — TEAR BREAK UP TIME (TBUT)
OS_TBUT: 1+
OD_TBUT: 1+

## 2022-05-03 ASSESSMENT — PACHYMETRY
OD_CT_UM: 542
OD_CT_CORRECTION: 0
OS_CT_CORRECTION: -4
OS_CT_UM: 591

## 2022-05-03 ASSESSMENT — KERATOMETRY
OD_K2POWER_DIOPTERS: 43.00
OD_AXISANGLE_DEGREES: 090
OS_K1POWER_DIOPTERS: 43.00
OD_K1POWER_DIOPTERS: 43.00
OS_K2POWER_DIOPTERS: 43.75
OS_AXISANGLE_DEGREES: 125

## 2022-05-03 ASSESSMENT — LID POSITION - DERMATOCHALASIS
OD_DERMATOCHALASIS: 2+
OS_DERMATOCHALASIS: 2+ 3+

## 2022-05-03 ASSESSMENT — LID POSITION - PTOSIS
OD_PTOSIS: 1+
OS_PTOSIS: 2+

## 2022-10-03 ENCOUNTER — DOCTOR'S OFFICE (OUTPATIENT)
Dept: URBAN - METROPOLITAN AREA CLINIC 157 | Facility: CLINIC | Age: 77
Setting detail: OPHTHALMOLOGY
End: 2022-10-03
Payer: COMMERCIAL

## 2022-10-03 PROCEDURE — 92250 FUNDUS PHOTOGRAPHY W/I&R: CPT | Performed by: OPTOMETRIST

## 2022-10-03 PROCEDURE — 92012 INTRM OPH EXAM EST PATIENT: CPT | Performed by: OPTOMETRIST

## 2022-10-03 PROCEDURE — 92083 EXTENDED VISUAL FIELD XM: CPT | Performed by: OPTOMETRIST

## 2022-10-03 ASSESSMENT — REFRACTION_MANIFEST
OU_VA: 20/30
OD_AXIS: 160
OS_ADD: +2.50
OS_VA2: 20/30(J2)
OD_ADD: +2.50
OD_CYLINDER: +0.50
OS_VA1: 20/20
OD_SPHERE: -1.25
OD_VA2: 20/25(J1)
OS_CYLINDER: +0.75
OD_CYLINDER: SPH
OS_SPHERE: -1.00
OS_CYLINDER: +0.50
OS_CYLINDER: +0.50
OS_VA1: 20/25-
OD_ADD: +2.50
OD_VA1: 20/40-1
OD_VA1: 20/40
OS_SPHERE: PLANO
OD_CYLINDER: +0.50
OD_VA1: 20/20-
OD_VA2: 20/30(J2)
OS_AXIS: 165
OS_ADD: +2.50
OS_CYLINDER: +0.50
OD_SPHERE: PLANO
OS_AXIS: 145
OD_SPHERE: -1.25
OS_VA2: 20/30(J2)
OD_ADD: +2.50
OS_AXIS: 165
OD_VA1: 20/20
OS_AXIS: 145
OD_SPHERE: PLANO
OS_SPHERE: PLANO
OD_VA2: 20/20
OS_VA1: 20/30
OS_ADD: +2.50
OS_VA1: 20/40-2
OD_AXIS: 160
OD_CYLINDER: SPH
OS_SPHERE: -1.00
OS_VA2: 20/20

## 2022-10-03 ASSESSMENT — REFRACTION_AUTOREFRACTION
OS_CYLINDER: +0.75
OD_CYLINDER: +0.50
OS_SPHERE: -0.25
OD_AXIS: 170
OS_AXIS: 140
OD_SPHERE: -0.75

## 2022-10-03 ASSESSMENT — KERATOMETRY
OS_K2POWER_DIOPTERS: 43.75
OS_AXISANGLE_DEGREES: 125
OD_K2POWER_DIOPTERS: 43.00
OD_K1POWER_DIOPTERS: 43.00
OS_K1POWER_DIOPTERS: 43.00
OD_AXISANGLE_DEGREES: 090

## 2022-10-03 ASSESSMENT — LID POSITION - PTOSIS
OD_PTOSIS: 1+
OS_PTOSIS: 2+

## 2022-10-03 ASSESSMENT — AXIALLENGTH_DERIVED
OD_AL: 24.1794
OS_AL: 23.9349
OD_AL: 23.9765
OS_AL: 23.9349
OD_AL: 24.1794
OS_AL: 23.5891

## 2022-10-03 ASSESSMENT — TEAR BREAK UP TIME (TBUT)
OD_TBUT: 1+
OS_TBUT: 1+

## 2022-10-03 ASSESSMENT — VISUAL ACUITY
OD_BCVA: 20/25-2
OS_BCVA: 20/25

## 2022-10-03 ASSESSMENT — SPHEQUIV_DERIVED
OS_SPHEQUIV: 0.125
OD_SPHEQUIV: -1
OS_SPHEQUIV: -0.75
OD_SPHEQUIV: -0.5
OS_SPHEQUIV: -0.75
OD_SPHEQUIV: -1

## 2022-10-03 ASSESSMENT — PACHYMETRY
OD_CT_CORRECTION: 0
OD_CT_UM: 542
OS_CT_CORRECTION: -4
OS_CT_UM: 591

## 2022-10-03 ASSESSMENT — TONOMETRY
OS_IOP_MMHG: 12
OD_IOP_MMHG: 11

## 2022-10-03 ASSESSMENT — CONFRONTATIONAL VISUAL FIELD TEST (CVF)
OD_FINDINGS: FULL
OS_FINDINGS: FULL

## 2022-10-03 ASSESSMENT — LID POSITION - DERMATOCHALASIS
OD_DERMATOCHALASIS: 2+
OS_DERMATOCHALASIS: 2+ 3+

## 2022-11-02 ENCOUNTER — DOCTOR'S OFFICE (OUTPATIENT)
Dept: URBAN - METROPOLITAN AREA CLINIC 157 | Facility: CLINIC | Age: 77
Setting detail: OPHTHALMOLOGY
End: 2022-11-02
Payer: COMMERCIAL

## 2022-11-02 DIAGNOSIS — H40.1112: ICD-10-CM

## 2022-11-02 DIAGNOSIS — H40.1121: ICD-10-CM

## 2022-11-02 DIAGNOSIS — H26.493: ICD-10-CM

## 2022-11-02 DIAGNOSIS — Z96.1: ICD-10-CM

## 2022-11-02 DIAGNOSIS — H16.223: ICD-10-CM

## 2022-11-02 PROCEDURE — 92012 INTRM OPH EXAM EST PATIENT: CPT | Performed by: OPHTHALMOLOGY

## 2022-11-02 ASSESSMENT — SPHEQUIV_DERIVED
OS_SPHEQUIV: -0.75
OD_SPHEQUIV: -1
OD_SPHEQUIV: -0.375
OD_SPHEQUIV: -1
OS_SPHEQUIV: 0.125
OS_SPHEQUIV: -0.75

## 2022-11-02 ASSESSMENT — REFRACTION_MANIFEST
OS_VA1: 20/20
OD_VA1: 20/40
OS_SPHERE: PLANO
OS_ADD: +2.50
OD_VA1: 20/20
OS_SPHERE: -1.00
OD_SPHERE: PLANO
OD_CYLINDER: +0.50
OD_VA1: 20/20-
OS_AXIS: 165
OS_SPHERE: -1.00
OD_SPHERE: -1.25
OD_ADD: +2.50
OD_CYLINDER: +0.50
OS_VA1: 20/40-2
OD_SPHERE: -1.25
OD_VA2: 20/25(J1)
OD_CYLINDER: SPH
OS_AXIS: 145
OS_VA1: 20/30
OS_AXIS: 145
OD_ADD: +2.50
OS_ADD: +2.50
OD_VA2: 20/25(J1)
OD_CYLINDER: SPH
OS_ADD: +2.50
OS_ADD: +2.50
OD_VA1: 20/20-
OU_VA: 20/30
OS_CYLINDER: +0.50
OS_VA1: 20/25-
OD_SPHERE: PLANO
OD_CYLINDER: SPH
OS_VA1: 20/30
OS_CYLINDER: +0.50
OS_CYLINDER: +0.75
OD_AXIS: 160
OS_AXIS: 148
OS_CYLINDER: +0.50
OS_VA2: 20/30(J2)
OS_SPHERE: PLANO
OS_VA2: 20/30(J2)
OD_VA2: 20/20
OD_ADD: +2.50
OD_AXIS: 160
OD_ADD: +2.50
OS_VA2: 20/20
OD_SPHERE: PLANO
OS_AXIS: 165
OS_VA2: 20/30(J2)
OS_CYLINDER: +0.50
OD_VA1: 20/40-1
OS_SPHERE: PLANO
OD_VA2: 20/30(J2)

## 2022-11-02 ASSESSMENT — REFRACTION_AUTOREFRACTION
OD_CYLINDER: +0.25
OD_AXIS: 175
OD_SPHERE: -0.50
OS_AXIS: 150
OS_SPHERE: -0.25
OS_CYLINDER: +0.75

## 2022-11-02 ASSESSMENT — AXIALLENGTH_DERIVED
OS_AL: 23.9823
OD_AL: 24.0834
OD_AL: 23.8323
OS_AL: 23.9823
OS_AL: 23.6351
OD_AL: 24.0834

## 2022-11-02 ASSESSMENT — TEAR BREAK UP TIME (TBUT)
OD_TBUT: 1+
OS_TBUT: 1+

## 2022-11-02 ASSESSMENT — KERATOMETRY
OS_AXISANGLE_DEGREES: 120
OS_K2POWER_DIOPTERS: 43.50
OD_K2POWER_DIOPTERS: 43.25
OD_AXISANGLE_DEGREES: 090
OD_K1POWER_DIOPTERS: 43.25
OS_K1POWER_DIOPTERS: 43.00

## 2022-11-02 ASSESSMENT — VISUAL ACUITY
OD_BCVA: 20/30-2
OS_BCVA: 20/25

## 2022-11-02 ASSESSMENT — PACHYMETRY
OD_CT_UM: 542
OS_CT_UM: 591
OD_CT_CORRECTION: 0
OS_CT_CORRECTION: -4

## 2022-11-02 ASSESSMENT — TONOMETRY
OD_IOP_MMHG: 15
OS_IOP_MMHG: 12

## 2022-11-07 ENCOUNTER — DOCTOR'S OFFICE (OUTPATIENT)
Dept: URBAN - METROPOLITAN AREA CLINIC 157 | Facility: CLINIC | Age: 77
Setting detail: OPHTHALMOLOGY
End: 2022-11-07
Payer: COMMERCIAL

## 2022-11-07 DIAGNOSIS — H26.492: ICD-10-CM

## 2022-11-07 PROCEDURE — 66821 AFTER CATARACT LASER SURGERY: CPT | Performed by: OPHTHALMOLOGY

## 2022-11-07 ASSESSMENT — REFRACTION_MANIFEST
OD_ADD: +2.50
OS_VA1: 20/40-2
OS_VA1: 20/30
OD_SPHERE: PLANO
OD_VA1: 20/40-1
OS_SPHERE: PLANO
OD_CYLINDER: SPH
OS_AXIS: 145
OD_VA2: 20/25(J1)
OS_SPHERE: -1.00
OD_CYLINDER: +0.50
OS_VA2: 20/30(J2)
OD_VA1: 20/20-
OD_SPHERE: -1.25
OS_SPHERE: PLANO
OS_VA2: 20/30(J2)
OD_AXIS: 160
OS_ADD: +2.50
OS_AXIS: 145
OS_VA2: 20/20
OD_CYLINDER: SPH
OD_ADD: +2.50
OS_VA1: 20/30
OS_CYLINDER: +0.50
OS_VA1: 20/20
OD_VA2: 20/30(J2)
OD_VA2: 20/20
OD_VA1: 20/20-
OS_VA1: 20/25-
OD_AXIS: 160
OD_SPHERE: PLANO
OD_CYLINDER: SPH
OS_ADD: +2.50
OS_AXIS: 148
OS_SPHERE: -1.00
OD_CYLINDER: +0.50
OS_CYLINDER: +0.50
OS_AXIS: 165
OD_ADD: +2.50
OD_VA2: 20/25(J1)
OD_SPHERE: -1.25
OS_VA2: 20/30(J2)
OU_VA: 20/30
OS_SPHERE: PLANO
OD_SPHERE: PLANO
OD_ADD: +2.50
OS_ADD: +2.50
OS_ADD: +2.50
OD_VA1: 20/20
OS_CYLINDER: +0.50
OS_AXIS: 165
OS_CYLINDER: +0.75
OS_CYLINDER: +0.50
OD_VA1: 20/40

## 2022-11-07 ASSESSMENT — AXIALLENGTH_DERIVED
OD_AL: 24.0834
OS_AL: 23.6351
OS_AL: 23.9823
OD_AL: 23.8323
OS_AL: 23.9823
OD_AL: 24.0834

## 2022-11-07 ASSESSMENT — SPHEQUIV_DERIVED
OS_SPHEQUIV: 0.125
OD_SPHEQUIV: -1
OS_SPHEQUIV: -0.75
OD_SPHEQUIV: -0.375
OD_SPHEQUIV: -1
OS_SPHEQUIV: -0.75

## 2022-11-07 ASSESSMENT — REFRACTION_AUTOREFRACTION
OS_CYLINDER: +0.75
OS_SPHERE: -0.25
OD_CYLINDER: +0.25
OD_SPHERE: -0.50
OD_AXIS: 175
OS_AXIS: 150

## 2022-11-07 ASSESSMENT — KERATOMETRY
OD_K1POWER_DIOPTERS: 43.25
OS_K2POWER_DIOPTERS: 43.50
OD_K2POWER_DIOPTERS: 43.25
OS_AXISANGLE_DEGREES: 120
OD_AXISANGLE_DEGREES: 090
OS_K1POWER_DIOPTERS: 43.00

## 2022-11-07 ASSESSMENT — VISUAL ACUITY
OD_BCVA: 20/30-2
OS_BCVA: 20/25

## 2022-11-07 ASSESSMENT — TEAR BREAK UP TIME (TBUT)
OD_TBUT: 1+
OS_TBUT: 1+

## 2022-11-21 ENCOUNTER — DOCTOR'S OFFICE (OUTPATIENT)
Dept: URBAN - METROPOLITAN AREA CLINIC 157 | Facility: CLINIC | Age: 77
Setting detail: OPHTHALMOLOGY
End: 2022-11-21
Payer: COMMERCIAL

## 2022-11-21 DIAGNOSIS — H26.491: ICD-10-CM

## 2022-11-21 PROBLEM — H16.223 KERATOCONJUNCTIVITIS SICCA ; BOTH EYES: Status: ACTIVE | Noted: 2021-10-04

## 2022-11-21 PROBLEM — H40.1121 POAG; RIGHT EYE MODERATE, LEFT EYE MILD: Status: ACTIVE | Noted: 2021-10-18

## 2022-11-21 PROBLEM — H26.493: Status: ACTIVE | Noted: 2022-03-22

## 2022-11-21 PROBLEM — Z96.1 PRESENCE OF INTRAOCULAR LENS ; BOTH EYES: Status: ACTIVE | Noted: 2021-11-17

## 2022-11-21 PROBLEM — H40.1112 POAG; RIGHT EYE MODERATE, LEFT EYE MILD: Status: ACTIVE | Noted: 2021-10-18

## 2022-11-21 PROBLEM — H04.123 DRY EYE SYNDROME LACRIMAL GLAND; BOTH EYES: Status: ACTIVE | Noted: 2021-10-18

## 2022-11-21 PROCEDURE — 66821 AFTER CATARACT LASER SURGERY: CPT | Performed by: OPHTHALMOLOGY

## 2022-11-21 ASSESSMENT — REFRACTION_MANIFEST
OD_SPHERE: -1.25
OS_VA1: 20/40-2
OD_VA1: 20/20-
OS_CYLINDER: +0.50
OU_VA: 20/30
OS_CYLINDER: +0.75
OS_VA2: 20/30(J2)
OS_SPHERE: PLANO
OS_VA1: 20/25-
OD_VA1: 20/40
OD_VA1: 20/40-1
OD_SPHERE: PLANO
OD_VA2: 20/30(J2)
OS_VA1: 20/20
OD_VA2: 20/25(J1)
OD_VA2: 20/25(J1)
OD_ADD: +2.50
OS_VA2: 20/20
OD_VA1: 20/20
OD_AXIS: 160
OD_CYLINDER: SPH
OS_VA1: 20/30
OS_ADD: +2.50
OD_CYLINDER: +0.50
OS_CYLINDER: +0.50
OD_CYLINDER: SPH
OD_ADD: +2.50
OS_ADD: +2.50
OD_SPHERE: PLANO
OS_SPHERE: PLANO
OS_AXIS: 145
OS_ADD: +2.50
OS_ADD: +2.50
OD_ADD: +2.50
OD_AXIS: 160
OS_AXIS: 165
OD_SPHERE: -1.25
OS_AXIS: 145
OS_CYLINDER: +0.50
OS_SPHERE: -1.00
OD_ADD: +2.50
OD_VA2: 20/20
OS_SPHERE: PLANO
OS_VA1: 20/30
OS_VA2: 20/30(J2)
OD_SPHERE: PLANO
OS_AXIS: 165
OS_VA2: 20/30(J2)
OS_SPHERE: -1.00
OS_CYLINDER: +0.50
OD_VA1: 20/20-
OD_CYLINDER: +0.50
OS_AXIS: 148
OD_CYLINDER: SPH

## 2022-11-21 ASSESSMENT — SPHEQUIV_DERIVED
OS_SPHEQUIV: 0.125
OD_SPHEQUIV: -1
OD_SPHEQUIV: -0.375
OS_SPHEQUIV: -0.75
OS_SPHEQUIV: -0.75
OD_SPHEQUIV: -1

## 2022-11-21 ASSESSMENT — AXIALLENGTH_DERIVED
OD_AL: 23.8323
OD_AL: 24.0834
OS_AL: 23.9823
OS_AL: 23.9823
OS_AL: 23.6351
OD_AL: 24.0834

## 2022-11-21 ASSESSMENT — VISUAL ACUITY
OS_BCVA: 20/25
OD_BCVA: 20/30-2

## 2022-11-21 ASSESSMENT — KERATOMETRY
OS_K1POWER_DIOPTERS: 43.00
OS_AXISANGLE_DEGREES: 120
OD_K2POWER_DIOPTERS: 43.25
OD_K1POWER_DIOPTERS: 43.25
OD_AXISANGLE_DEGREES: 090
OS_K2POWER_DIOPTERS: 43.50

## 2022-11-21 ASSESSMENT — REFRACTION_AUTOREFRACTION
OD_SPHERE: -0.50
OS_CYLINDER: +0.75
OD_AXIS: 175
OS_SPHERE: -0.25
OD_CYLINDER: +0.25
OS_AXIS: 150

## 2022-11-21 ASSESSMENT — TEAR BREAK UP TIME (TBUT)
OD_TBUT: 1+
OS_TBUT: 1+

## 2022-12-01 ENCOUNTER — DOCTOR'S OFFICE (OUTPATIENT)
Dept: URBAN - METROPOLITAN AREA CLINIC 157 | Facility: CLINIC | Age: 77
Setting detail: OPHTHALMOLOGY
End: 2022-12-01
Payer: COMMERCIAL

## 2022-12-01 DIAGNOSIS — H40.1112: ICD-10-CM

## 2022-12-01 DIAGNOSIS — H16.223: ICD-10-CM

## 2022-12-01 DIAGNOSIS — H02.834: ICD-10-CM

## 2022-12-01 DIAGNOSIS — H02.831: ICD-10-CM

## 2022-12-01 DIAGNOSIS — Z96.1: ICD-10-CM

## 2022-12-01 DIAGNOSIS — H40.1121: ICD-10-CM

## 2022-12-01 PROCEDURE — 99024 POSTOP FOLLOW-UP VISIT: CPT | Performed by: OPTOMETRIST

## 2022-12-01 ASSESSMENT — REFRACTION_MANIFEST
OD_VA1: 20/20-
OD_AXIS: 1652
OD_VA2: 20/30(J2)
OD_VA1: 20/20-
OS_VA2: 20/30(J2)
OS_VA2: 20/30(J2)
OD_ADD: +2.50
OS_AXIS: 160
OD_CYLINDER: +0.50
OS_CYLINDER: +0.25
OD_VA2: 20/25(J1)
OD_SPHERE: -0.50
OS_SPHERE: -0.25
OS_VA1: 20/25-
OS_VA1: 20/20
OU_VA: 20/20
OS_AXIS: 145
OS_ADD: +2.25
OS_SPHERE: PLANO
OD_SPHERE: PLANO
OD_CYLINDER: SPH
OS_CYLINDER: +0.50
OD_ADD: +2.25
OS_ADD: +2.50

## 2022-12-01 ASSESSMENT — REFRACTION_AUTOREFRACTION
OD_AXIS: 165
OS_AXIS: 145
OD_SPHERE: -0.75
OS_CYLINDER: +1.00
OD_CYLINDER: +0.50
OS_SPHERE: -0.25

## 2022-12-01 ASSESSMENT — LID POSITION - PTOSIS
OS_PTOSIS: 2+
OD_PTOSIS: 1+

## 2022-12-01 ASSESSMENT — TEAR BREAK UP TIME (TBUT)
OS_TBUT: 1+
OD_TBUT: 1+

## 2022-12-01 ASSESSMENT — SPHEQUIV_DERIVED
OD_SPHEQUIV: -0.5
OD_SPHEQUIV: -0.25
OS_SPHEQUIV: 0.25
OS_SPHEQUIV: -0.125

## 2022-12-01 ASSESSMENT — KERATOMETRY
OD_K2POWER_DIOPTERS: 43.00
OS_K2POWER_DIOPTERS: 43.50
OD_K1POWER_DIOPTERS: 43.00
OS_K1POWER_DIOPTERS: 43.00
OS_AXISANGLE_DEGREES: 120
OD_AXISANGLE_DEGREES: 090

## 2022-12-01 ASSESSMENT — PACHYMETRY
OD_CT_UM: 542
OS_CT_CORRECTION: -4
OS_CT_UM: 591
OD_CT_CORRECTION: 0

## 2022-12-01 ASSESSMENT — AXIALLENGTH_DERIVED
OS_AL: 23.5863
OS_AL: 23.7333
OD_AL: 23.8763
OD_AL: 23.9765

## 2022-12-01 ASSESSMENT — VISUAL ACUITY
OS_BCVA: 20/20-
OD_BCVA: 20/25-

## 2022-12-01 ASSESSMENT — LID POSITION - DERMATOCHALASIS
OD_DERMATOCHALASIS: 2+
OS_DERMATOCHALASIS: 2+ 3+

## 2022-12-01 ASSESSMENT — TONOMETRY
OD_IOP_MMHG: 13
OS_IOP_MMHG: 14

## 2023-01-16 ENCOUNTER — DOCTOR'S OFFICE (OUTPATIENT)
Dept: URBAN - METROPOLITAN AREA CLINIC 157 | Facility: CLINIC | Age: 78
Setting detail: OPHTHALMOLOGY
End: 2023-01-16
Payer: COMMERCIAL

## 2023-01-16 DIAGNOSIS — H40.1112: ICD-10-CM

## 2023-01-16 DIAGNOSIS — Z96.1: ICD-10-CM

## 2023-01-16 DIAGNOSIS — H02.831: ICD-10-CM

## 2023-01-16 DIAGNOSIS — H40.1121: ICD-10-CM

## 2023-01-16 DIAGNOSIS — H02.834: ICD-10-CM

## 2023-01-16 DIAGNOSIS — H02.413: ICD-10-CM

## 2023-01-16 DIAGNOSIS — H16.223: ICD-10-CM

## 2023-01-16 PROCEDURE — 92081 LIMITED VISUAL FIELD XM: CPT | Performed by: OPHTHALMOLOGY

## 2023-01-16 PROCEDURE — 99024 POSTOP FOLLOW-UP VISIT: CPT | Performed by: OPHTHALMOLOGY

## 2023-01-16 ASSESSMENT — REFRACTION_AUTOREFRACTION
OS_CYLINDER: +0.75
OS_SPHERE: -0.50
OS_AXIS: 135
OD_SPHERE: -0.50
OD_AXIS: 180
OD_CYLINDER: +0.50

## 2023-01-16 ASSESSMENT — REFRACTION_MANIFEST
OS_SPHERE: -0.25
OU_VA: 20/20
OD_SPHERE: -0.50
OS_CYLINDER: +0.25
OD_CYLINDER: +0.50
OD_VA1: 20/20-
OD_AXIS: 1652
OS_AXIS: 145
OD_VA1: 20/20-
OD_ADD: +2.50
OS_CYLINDER: +0.50
OD_ADD: +2.25
OS_ADD: +2.25
OS_ADD: +2.50
OD_VA2: 20/25(J1)
OS_SPHERE: PLANO
OS_AXIS: 160
OD_CYLINDER: SPH
OS_VA1: 20/25-
OS_VA2: 20/30(J2)
OS_VA1: 20/20
OD_SPHERE: PLANO
OS_VA2: 20/30(J2)
OD_VA2: 20/30(J2)

## 2023-01-16 ASSESSMENT — KERATOMETRY
OS_K1POWER_DIOPTERS: 43.00
OD_K2POWER_DIOPTERS: 43.00
OS_AXISANGLE_DEGREES: 120
OD_AXISANGLE_DEGREES: 090
OD_K1POWER_DIOPTERS: 43.00
OS_K2POWER_DIOPTERS: 43.75

## 2023-01-16 ASSESSMENT — PACHYMETRY
OS_CT_UM: 591
OS_CT_CORRECTION: -4
OD_CT_CORRECTION: 0
OD_CT_UM: 542

## 2023-01-16 ASSESSMENT — SPHEQUIV_DERIVED
OD_SPHEQUIV: -0.25
OD_SPHEQUIV: -0.25
OS_SPHEQUIV: -0.125
OS_SPHEQUIV: -0.125

## 2023-01-16 ASSESSMENT — AXIALLENGTH_DERIVED
OS_AL: 23.6869
OD_AL: 23.8763
OS_AL: 23.6869
OD_AL: 23.8763

## 2023-01-16 ASSESSMENT — LID POSITION - DERMATOCHALASIS
OD_DERMATOCHALASIS: 2+
OS_DERMATOCHALASIS: 2+ 3+

## 2023-01-16 ASSESSMENT — TONOMETRY
OS_IOP_MMHG: 18
OD_IOP_MMHG: 16

## 2023-01-16 ASSESSMENT — TEAR BREAK UP TIME (TBUT)
OS_TBUT: 1+
OD_TBUT: 1+

## 2023-01-16 ASSESSMENT — VISUAL ACUITY
OS_BCVA: 20/20-2
OD_BCVA: 20/20-2

## 2023-01-16 ASSESSMENT — LID POSITION - PTOSIS
OD_PTOSIS: 1+
OS_PTOSIS: 2+

## 2023-02-13 ENCOUNTER — AMBUL SURGICAL CARE (OUTPATIENT)
Dept: URBAN - METROPOLITAN AREA CLINIC 158 | Facility: CLINIC | Age: 78
Setting detail: OPHTHALMOLOGY
End: 2023-02-13
Payer: COMMERCIAL

## 2023-02-13 DIAGNOSIS — H02.413: ICD-10-CM

## 2023-02-13 PROCEDURE — 67904 REPAIR EYELID DEFECT: CPT | Performed by: OPHTHALMOLOGY

## 2023-02-20 ENCOUNTER — DOCTOR'S OFFICE (OUTPATIENT)
Dept: URBAN - METROPOLITAN AREA CLINIC 157 | Facility: CLINIC | Age: 78
Setting detail: OPHTHALMOLOGY
End: 2023-02-20
Payer: COMMERCIAL

## 2023-02-20 DIAGNOSIS — H02.834: ICD-10-CM

## 2023-02-20 DIAGNOSIS — H02.413: ICD-10-CM

## 2023-02-20 DIAGNOSIS — H02.831: ICD-10-CM

## 2023-02-20 PROCEDURE — 99024 POSTOP FOLLOW-UP VISIT: CPT | Performed by: OPHTHALMOLOGY

## 2023-02-20 ASSESSMENT — REFRACTION_MANIFEST
OS_AXIS: 160
OS_AXIS: 145
OS_SPHERE: PLANO
OD_ADD: +2.25
OD_VA2: 20/25(J1)
OS_SPHERE: -0.25
OD_VA1: 20/20-
OS_VA2: 20/30(J2)
OD_SPHERE: PLANO
OS_VA2: 20/30(J2)
OD_AXIS: 1652
OS_CYLINDER: +0.50
OD_VA2: 20/30(J2)
OS_VA1: 20/25-
OS_CYLINDER: +0.25
OS_VA1: 20/20
OD_CYLINDER: +0.50
OS_ADD: +2.25
OU_VA: 20/20
OD_ADD: +2.50
OD_CYLINDER: SPH
OD_SPHERE: -0.50
OD_VA1: 20/20-
OS_ADD: +2.50

## 2023-02-20 ASSESSMENT — AXIALLENGTH_DERIVED
OS_AL: 23.6869
OD_AL: 23.8763
OS_AL: 23.6869
OD_AL: 23.8763

## 2023-02-20 ASSESSMENT — PACHYMETRY
OD_CT_CORRECTION: 0
OD_CT_UM: 542
OS_CT_CORRECTION: -4
OS_CT_UM: 591

## 2023-02-20 ASSESSMENT — VISUAL ACUITY
OD_BCVA: 20/20-2
OS_BCVA: 20/20-2

## 2023-02-20 ASSESSMENT — TONOMETRY
OD_IOP_MMHG: 14
OS_IOP_MMHG: 15

## 2023-02-20 ASSESSMENT — SPHEQUIV_DERIVED
OD_SPHEQUIV: -0.25
OS_SPHEQUIV: -0.125
OD_SPHEQUIV: -0.25
OS_SPHEQUIV: -0.125

## 2023-02-20 ASSESSMENT — LID EXAM ASSESSMENTS
OS_EDEMA: LUL 1+
OD_ECCHYMOSIS: RUL 1+ 2+
OS_ECCHYMOSIS: LUL 1+ 2+
OD_EDEMA: RUL 1+

## 2023-02-20 ASSESSMENT — REFRACTION_AUTOREFRACTION
OD_AXIS: 180
OS_CYLINDER: +0.75
OS_AXIS: 135
OD_SPHERE: -0.50
OD_CYLINDER: +0.50
OS_SPHERE: -0.50

## 2023-02-20 ASSESSMENT — KERATOMETRY
OD_K1POWER_DIOPTERS: 43.00
OD_AXISANGLE_DEGREES: 090
OS_K1POWER_DIOPTERS: 43.00
OS_K2POWER_DIOPTERS: 43.75
OD_K2POWER_DIOPTERS: 43.00
OS_AXISANGLE_DEGREES: 120

## 2023-02-20 ASSESSMENT — TEAR BREAK UP TIME (TBUT)
OS_TBUT: 1+
OD_TBUT: 1+

## 2023-04-03 ENCOUNTER — DOCTOR'S OFFICE (OUTPATIENT)
Dept: URBAN - METROPOLITAN AREA CLINIC 157 | Facility: CLINIC | Age: 78
Setting detail: OPHTHALMOLOGY
End: 2023-04-03
Payer: COMMERCIAL

## 2023-04-03 DIAGNOSIS — H02.831: ICD-10-CM

## 2023-04-03 DIAGNOSIS — H02.413: ICD-10-CM

## 2023-04-03 DIAGNOSIS — H02.834: ICD-10-CM

## 2023-04-03 PROCEDURE — 99024 POSTOP FOLLOW-UP VISIT: CPT | Performed by: OPHTHALMOLOGY

## 2023-04-03 ASSESSMENT — REFRACTION_MANIFEST
OU_VA: 20/20
OD_VA2: 20/30(J2)
OD_ADD: +2.25
OD_SPHERE: -0.50
OD_SPHERE: PLANO
OS_AXIS: 160
OD_ADD: +2.50
OD_AXIS: 1652
OS_SPHERE: PLANO
OS_ADD: +2.50
OS_VA1: 20/25-
OS_VA1: 20/20
OS_ADD: +2.25
OS_VA2: 20/30(J2)
OS_CYLINDER: +0.25
OS_VA2: 20/30(J2)
OD_CYLINDER: +0.50
OS_AXIS: 145
OS_CYLINDER: +0.50
OD_CYLINDER: SPH
OD_VA1: 20/20-
OS_SPHERE: -0.25
OD_VA1: 20/20-
OD_VA2: 20/25(J1)

## 2023-04-03 ASSESSMENT — VISUAL ACUITY
OD_BCVA: 20/25
OS_BCVA: 20/25

## 2023-04-03 ASSESSMENT — SPHEQUIV_DERIVED
OD_SPHEQUIV: -0.25
OD_SPHEQUIV: -0.75
OS_SPHEQUIV: -0.125
OS_SPHEQUIV: -0.375

## 2023-04-03 ASSESSMENT — TEAR BREAK UP TIME (TBUT)
OS_TBUT: 1+
OD_TBUT: 1+

## 2023-04-03 ASSESSMENT — REFRACTION_AUTOREFRACTION
OS_AXIS: 120
OD_SPHERE: -1.00
OD_AXIS: 180
OS_SPHERE: -0.75
OD_CYLINDER: +0.50
OS_CYLINDER: +0.75

## 2023-04-03 ASSESSMENT — KERATOMETRY
OS_AXISANGLE_DEGREES: 120
OD_K1POWER_DIOPTERS: 43.00
OD_K2POWER_DIOPTERS: 43.00
OS_K2POWER_DIOPTERS: 43.75
OD_AXISANGLE_DEGREES: 090
OS_K1POWER_DIOPTERS: 43.00

## 2023-04-03 ASSESSMENT — AXIALLENGTH_DERIVED
OD_AL: 24.0776
OD_AL: 23.8763
OS_AL: 23.7855
OS_AL: 23.6869

## 2023-04-03 ASSESSMENT — PACHYMETRY
OS_CT_CORRECTION: -4
OS_CT_UM: 591
OD_CT_CORRECTION: 0
OD_CT_UM: 542

## 2023-04-03 ASSESSMENT — TONOMETRY
OS_IOP_MMHG: 15
OD_IOP_MMHG: 15

## 2023-04-03 ASSESSMENT — LID POSITION - PTOSIS
OD_PTOSIS: ABSENT
OS_PTOSIS: 1+

## 2024-01-24 NOTE — TELEPHONE ENCOUNTER
Called pt and left message to call back. REVIEW OF SYSTEMS    Constitutional: []Fever []Sweats []Chills [] Recent Injury [x] Denies all unless marked  HEENT:[]Headache  [] Head Injury [] Hearing Loss  [] Sore Throat  [] Ear Ache [x] Denies all unless marked  Spine:  [] Neck pain  [] Back pain  [] Sciaticia  [x] Denies all unless marked  Cardiovascular:[]Heart Disease []Palpitations [] Chest Pain   [x] Denies all unless marked  Pulmonary: []Shortness of Breath []Cough   [x] Denies all unless marked  Psychiatric/Behavioral:[] Depression [] Anxiety [x] Denies all unless marked  Gastrointestinal: []Nausea  []Vomiting  []Abdominal Pain  []Constipation  []Diarrhea  [x] Denies all unless marked  Genitourinary:   [] Frequency  [] Urgency  [] Dysuria [] Incontinence  [x] Denies all unless marked  Extremities: []Pain  []Swelling  [x] Denies all unless marked  Musculoskeletal: [] Myalgias  [] Joint Pain  [] Arthritis [] Muscle Cramps [] Muscle Twitches  [x] Denies all unless marked  Sleep: []Insomnia[]Snoring []Restless Legs  []Sleep Apnea  []Daytime Sleepiness  [x] Denies all unless marked  Skin:[] Rash [] Color Change [x] Denies all unless marked   Neurological:[]Visual Disturbance [x] Memory Loss []Loss of Balance []Slurred Speech []Weakness []Seizures  [] Dizziness [x] Denies all unless marked

## 2024-02-05 ENCOUNTER — DOCTOR'S OFFICE (OUTPATIENT)
Dept: URBAN - METROPOLITAN AREA CLINIC 157 | Facility: CLINIC | Age: 79
Setting detail: OPHTHALMOLOGY
End: 2024-02-05
Payer: COMMERCIAL

## 2024-02-05 DIAGNOSIS — H40.1121: ICD-10-CM

## 2024-02-05 DIAGNOSIS — Z96.1: ICD-10-CM

## 2024-02-05 DIAGNOSIS — H02.412: ICD-10-CM

## 2024-02-05 DIAGNOSIS — H04.123: ICD-10-CM

## 2024-02-05 DIAGNOSIS — H40.1112: ICD-10-CM

## 2024-02-05 PROCEDURE — 92285 EXTERNAL OCULAR PHOTOGRAPHY: CPT | Performed by: OPHTHALMOLOGY

## 2024-02-05 PROCEDURE — 92012 INTRM OPH EXAM EST PATIENT: CPT | Performed by: OPHTHALMOLOGY

## 2024-02-05 ASSESSMENT — REFRACTION_MANIFEST
OD_SPHERE: -0.50
OS_VA2: 20/30(J2)
OS_VA1: 20/20
OS_ADD: +2.25
OS_CYLINDER: +0.25
OS_CYLINDER: +0.50
OS_SPHERE: PLANO
OD_CYLINDER: SPH
OD_ADD: +2.50
OS_VA1: 20/25-
OD_ADD: +2.25
OS_AXIS: 160
OD_CYLINDER: +0.50
OS_SPHERE: -0.25
OD_VA2: 20/30(J2)
OD_SPHERE: PLANO
OD_VA1: 20/20-
OU_VA: 20/20
OS_AXIS: 145
OS_ADD: +2.50
OD_VA1: 20/20-
OD_VA2: 20/25(J1)
OS_VA2: 20/30(J2)
OD_AXIS: 1652

## 2024-02-05 ASSESSMENT — REFRACTION_AUTOREFRACTION
OD_AXIS: 165
OS_CYLINDER: +1.00
OS_AXIS: 140
OS_SPHERE: -0.50
OD_SPHERE: -0.75
OD_CYLINDER: +0.25

## 2024-02-05 ASSESSMENT — LID POSITION - PTOSIS
OS_PTOSIS: 2+
OD_PTOSIS: ABSENT

## 2024-02-05 ASSESSMENT — SPHEQUIV_DERIVED
OS_SPHEQUIV: 0
OD_SPHEQUIV: -0.625
OS_SPHEQUIV: -0.125
OD_SPHEQUIV: -0.25

## 2024-02-05 ASSESSMENT — TEAR BREAK UP TIME (TBUT)
OS_TBUT: 1+
OD_TBUT: 1+